# Patient Record
Sex: FEMALE | Race: WHITE | NOT HISPANIC OR LATINO | Employment: OTHER | URBAN - METROPOLITAN AREA
[De-identification: names, ages, dates, MRNs, and addresses within clinical notes are randomized per-mention and may not be internally consistent; named-entity substitution may affect disease eponyms.]

---

## 2017-05-02 ENCOUNTER — HOSPITAL ENCOUNTER (OUTPATIENT)
Dept: GENERAL RADIOLOGY | Facility: HOSPITAL | Age: 77
Discharge: HOME | End: 2017-05-02
Attending: INTERNAL MEDICINE | Admitting: INTERNAL MEDICINE

## 2017-05-02 ENCOUNTER — TRANSCRIBE ORDERS (OUTPATIENT)
Dept: ADMINISTRATIVE | Facility: HOSPITAL | Age: 77
End: 2017-05-02

## 2017-05-02 DIAGNOSIS — R06.02 SHORTNESS OF BREATH ON EXERTION: Primary | ICD-10-CM

## 2017-05-02 PROCEDURE — 71020 HC CHEST PA AND LATERAL: CPT

## 2018-06-18 ENCOUNTER — TRANSCRIBE ORDERS (OUTPATIENT)
Dept: ADMINISTRATIVE | Facility: HOSPITAL | Age: 78
End: 2018-06-18

## 2018-06-18 DIAGNOSIS — Z78.0 POSTMENOPAUSAL: Primary | ICD-10-CM

## 2018-06-18 DIAGNOSIS — Z12.31 VISIT FOR SCREENING MAMMOGRAM: Primary | ICD-10-CM

## 2019-01-04 PROBLEM — R26.9 GAIT DISTURBANCE: Status: ACTIVE | Noted: 2019-01-04

## 2019-01-04 PROBLEM — Z91.81 RISK FOR FALLS: Status: ACTIVE | Noted: 2019-01-04

## 2019-01-04 PROBLEM — R06.02 SHORTNESS OF BREATH ON EXERTION: Status: ACTIVE | Noted: 2019-01-04

## 2019-01-04 PROBLEM — F02.80 LATE ONSET ALZHEIMER'S DISEASE WITHOUT BEHAVIORAL DISTURBANCE (HCC): Status: ACTIVE | Noted: 2019-01-04

## 2019-01-04 PROBLEM — G30.1 LATE ONSET ALZHEIMER'S DISEASE WITHOUT BEHAVIORAL DISTURBANCE (HCC): Status: ACTIVE | Noted: 2019-01-04

## 2019-01-04 PROBLEM — E78.2 MIXED HYPERLIPIDEMIA: Status: ACTIVE | Noted: 2019-01-04

## 2019-01-04 PROBLEM — K21.9 GASTROESOPHAGEAL REFLUX DISEASE WITHOUT ESOPHAGITIS: Status: ACTIVE | Noted: 2019-01-04

## 2019-01-04 PROBLEM — F32.1 DEPRESSION, MAJOR, SINGLE EPISODE, MODERATE (HCC): Status: ACTIVE | Noted: 2019-01-04

## 2019-01-04 PROBLEM — E53.8 B12 DEFICIENCY: Status: ACTIVE | Noted: 2019-01-04

## 2019-01-09 RX ORDER — ESCITALOPRAM OXALATE 5 MG/1
TABLET ORAL
Qty: 90 TABLET | Refills: 2 | Status: SHIPPED | OUTPATIENT
Start: 2019-01-09 | End: 2019-01-25 | Stop reason: DRUGHIGH

## 2019-01-25 ENCOUNTER — LAB REQUISITION (OUTPATIENT)
Dept: LAB | Facility: HOSPITAL | Age: 79
End: 2019-01-25

## 2019-01-25 ENCOUNTER — OFFICE VISIT (OUTPATIENT)
Dept: INTERNAL MEDICINE | Facility: CLINIC | Age: 79
End: 2019-01-25

## 2019-01-25 VITALS
HEIGHT: 65 IN | DIASTOLIC BLOOD PRESSURE: 72 MMHG | BODY MASS INDEX: 26.99 KG/M2 | TEMPERATURE: 98 F | SYSTOLIC BLOOD PRESSURE: 122 MMHG | HEART RATE: 76 BPM | WEIGHT: 162 LBS

## 2019-01-25 DIAGNOSIS — F02.80 LATE ONSET ALZHEIMER'S DISEASE WITHOUT BEHAVIORAL DISTURBANCE (HCC): Primary | ICD-10-CM

## 2019-01-25 DIAGNOSIS — K21.9 GASTROESOPHAGEAL REFLUX DISEASE WITHOUT ESOPHAGITIS: ICD-10-CM

## 2019-01-25 DIAGNOSIS — Z00.00 ROUTINE GENERAL MEDICAL EXAMINATION AT A HEALTH CARE FACILITY: ICD-10-CM

## 2019-01-25 DIAGNOSIS — Z91.81 RISK FOR FALLS: ICD-10-CM

## 2019-01-25 DIAGNOSIS — E78.2 MIXED HYPERLIPIDEMIA: ICD-10-CM

## 2019-01-25 DIAGNOSIS — F32.1 DEPRESSION, MAJOR, SINGLE EPISODE, MODERATE (HCC): ICD-10-CM

## 2019-01-25 DIAGNOSIS — G30.1 LATE ONSET ALZHEIMER'S DISEASE WITHOUT BEHAVIORAL DISTURBANCE (HCC): Primary | ICD-10-CM

## 2019-01-25 DIAGNOSIS — R26.9 GAIT DISTURBANCE: ICD-10-CM

## 2019-01-25 DIAGNOSIS — R06.02 SHORTNESS OF BREATH ON EXERTION: ICD-10-CM

## 2019-01-25 DIAGNOSIS — E53.8 B12 DEFICIENCY: ICD-10-CM

## 2019-01-25 LAB
ALBUMIN SERPL-MCNC: 4.59 G/DL (ref 3.2–4.8)
ALBUMIN/GLOB SERPL: 1.6 G/DL (ref 1.5–2.5)
ALP SERPL-CCNC: 116 U/L (ref 25–100)
ALT SERPL W P-5'-P-CCNC: 19 U/L (ref 7–40)
ANION GAP SERPL CALCULATED.3IONS-SCNC: 13 MMOL/L (ref 3–11)
AST SERPL-CCNC: 28 U/L (ref 0–33)
BILIRUB SERPL-MCNC: 0.6 MG/DL (ref 0.3–1.2)
BUN BLD-MCNC: 18 MG/DL (ref 9–23)
BUN/CREAT SERPL: 20.5 (ref 7–25)
CALCIUM SPEC-SCNC: 9.4 MG/DL (ref 8.7–10.4)
CHLORIDE SERPL-SCNC: 104 MMOL/L (ref 99–109)
CHOLEST SERPL-MCNC: 164 MG/DL (ref 0–200)
CO2 SERPL-SCNC: 22 MMOL/L (ref 20–31)
CREAT BLD-MCNC: 0.88 MG/DL (ref 0.6–1.3)
FOLATE SERPL-MCNC: 8.32 NG/ML (ref 3.2–20)
GFR SERPL CREATININE-BSD FRML MDRD: 62 ML/MIN/1.73
GLOBULIN UR ELPH-MCNC: 2.9 GM/DL
GLUCOSE BLD-MCNC: 105 MG/DL (ref 70–100)
HDLC SERPL-MCNC: 53 MG/DL (ref 40–60)
LDLC SERPL CALC-MCNC: 77 MG/DL (ref 0–100)
LDLC/HDLC SERPL: 1.45 {RATIO}
POTASSIUM BLD-SCNC: 4.3 MMOL/L (ref 3.5–5.5)
PROT SERPL-MCNC: 7.5 G/DL (ref 5.7–8.2)
SODIUM BLD-SCNC: 139 MMOL/L (ref 132–146)
TRIGL SERPL-MCNC: 172 MG/DL (ref 0–150)
VIT B12 BLD-MCNC: 918 PG/ML (ref 211–911)
VLDLC SERPL-MCNC: 34.4 MG/DL

## 2019-01-25 PROCEDURE — 82746 ASSAY OF FOLIC ACID SERUM: CPT | Performed by: INTERNAL MEDICINE

## 2019-01-25 PROCEDURE — 80061 LIPID PANEL: CPT | Performed by: INTERNAL MEDICINE

## 2019-01-25 PROCEDURE — 80053 COMPREHEN METABOLIC PANEL: CPT | Performed by: INTERNAL MEDICINE

## 2019-01-25 PROCEDURE — 99214 OFFICE O/P EST MOD 30 MIN: CPT | Performed by: INTERNAL MEDICINE

## 2019-01-25 PROCEDURE — 82607 VITAMIN B-12: CPT | Performed by: INTERNAL MEDICINE

## 2019-01-25 RX ORDER — PRAVASTATIN SODIUM 20 MG
20 TABLET ORAL DAILY
COMMUNITY
End: 2019-09-03 | Stop reason: SDUPTHER

## 2019-01-25 RX ORDER — UBIDECARENONE 75 MG
50 CAPSULE ORAL DAILY
COMMUNITY
End: 2019-01-27

## 2019-01-25 RX ORDER — DONEPEZIL HYDROCHLORIDE 10 MG/1
10 TABLET, FILM COATED ORAL NIGHTLY
COMMUNITY
End: 2019-06-03 | Stop reason: SDUPTHER

## 2019-01-25 RX ORDER — ESCITALOPRAM OXALATE 10 MG/1
10 TABLET ORAL DAILY
Qty: 30 TABLET | Refills: 5 | Status: SHIPPED | OUTPATIENT
Start: 2019-01-25 | End: 2019-09-13

## 2019-01-25 RX ORDER — MEMANTINE HYDROCHLORIDE 14 MG/1
CAPSULE, EXTENDED RELEASE ORAL
COMMUNITY
Start: 2018-12-17 | End: 2019-09-05 | Stop reason: SDUPTHER

## 2019-01-25 NOTE — PATIENT INSTRUCTIONS
Continue current medications for memory.  Increase escitalopram from 5 mg a day to 10 mg a day to improve depression.  Doing some physical activity also helps depression.  Social interaction helps depression.    Try to do some of the exercises from physical therapy every day.  Try to walk around the house more.  Try to eat less fats and sugars and snack foods.  Fall Prevention in the Home  Falls can cause injuries. They can happen to people of all ages. There are many things you can do to make your home safe and to help prevent falls.  What can I do on the outside of my home?  · Regularly fix the edges of walkways and driveways and fix any cracks.  · Remove anything that might make you trip as you walk through a door, such as a raised step or threshold.  · Trim any bushes or trees on the path to your home.  · Use bright outdoor lighting.  · Clear any walking paths of anything that might make someone trip, such as rocks or tools.  · Regularly check to see if handrails are loose or broken. Make sure that both sides of any steps have handrails.  · Any raised decks and porches should have guardrails on the edges.  · Have any leaves, snow, or ice cleared regularly.  · Use sand or salt on walking paths during winter.  · Clean up any spills in your garage right away. This includes oil or grease spills.  What can I do in the bathroom?  · Use night lights.  · Install grab bars by the toilet and in the tub and shower. Do not use towel bars as grab bars.  · Use non-skid mats or decals in the tub or shower.  · If you need to sit down in the shower, use a plastic, non-slip stool.  · Keep the floor dry. Clean up any water that spills on the floor as soon as it happens.  · Remove soap buildup in the tub or shower regularly.  · Attach bath mats securely with double-sided non-slip rug tape.  · Do not have throw rugs and other things on the floor that can make you trip.  What can I do in the bedroom?  · Use night lights.  · Make sure  that you have a light by your bed that is easy to reach.  · Do not use any sheets or blankets that are too big for your bed. They should not hang down onto the floor.  · Have a firm chair that has side arms. You can use this for support while you get dressed.  · Do not have throw rugs and other things on the floor that can make you trip.  What can I do in the kitchen?  · Clean up any spills right away.  · Avoid walking on wet floors.  · Keep items that you use a lot in easy-to-reach places.  · If you need to reach something above you, use a strong step stool that has a grab bar.  · Keep electrical cords out of the way.  · Do not use floor polish or wax that makes floors slippery. If you must use wax, use non-skid floor wax.  · Do not have throw rugs and other things on the floor that can make you trip.  What can I do with my stairs?  · Do not leave any items on the stairs.  · Make sure that there are handrails on both sides of the stairs and use them. Fix handrails that are broken or loose. Make sure that handrails are as long as the stairways.  · Check any carpeting to make sure that it is firmly attached to the stairs. Fix any carpet that is loose or worn.  · Avoid having throw rugs at the top or bottom of the stairs. If you do have throw rugs, attach them to the floor with carpet tape.  · Make sure that you have a light switch at the top of the stairs and the bottom of the stairs. If you do not have them, ask someone to add them for you.  What else can I do to help prevent falls?  · Wear shoes that:  ? Do not have high heels.  ? Have rubber bottoms.  ? Are comfortable and fit you well.  ? Are closed at the toe. Do not wear sandals.  · If you use a stepladder:  ? Make sure that it is fully opened. Do not climb a closed stepladder.  ? Make sure that both sides of the stepladder are locked into place.  ? Ask someone to hold it for you, if possible.  · Clearly pako and make sure that you can see:  ? Any grab bars or  handrails.  ? First and last steps.  ? Where the edge of each step is.  · Use tools that help you move around (mobility aids) if they are needed. These include:  ? Canes.  ? Walkers.  ? Scooters.  ? Crutches.  · Turn on the lights when you go into a dark area. Replace any light bulbs as soon as they burn out.  · Set up your furniture so you have a clear path. Avoid moving your furniture around.  · If any of your floors are uneven, fix them.  · If there are any pets around you, be aware of where they are.  · Review your medicines with your doctor. Some medicines can make you feel dizzy. This can increase your chance of falling.  Ask your doctor what other things that you can do to help prevent falls.  This information is not intended to replace advice given to you by your health care provider. Make sure you discuss any questions you have with your health care provider.  Document Released: 10/14/2010 Document Revised: 05/25/2017 Document Reviewed: 01/22/2016  [employer] Interactive Patient Education © 2018 [employer]    Supporting Someone With Depression  Depression is a mental health condition that affects the way a person feels, thinks, and handles daily activities such as eating, sleeping, and working. When a person has depression, his or her condition can affect others around him or her, such as friends and family members. Friends and family can help by offering support and understanding.  What do I need to know about this condition?  The main symptoms of depression are:  · Constant depressed or irritable mood.  · Loss of interest in things and activities that were enjoyed in the past.    Other symptoms of depression include:  · Fatigue.  · Sleeping too much or too little.  · Difficulty falling asleep, or waking up early and not being able to get back to sleep.  · Difficulty concentrating or making decisions.  · Changes in appetite and weight.  · Staying away from others (isolating oneself).  · Expressing  feelings of guilt.  · Expressing suicidal thoughts or feelings.    What do I need to know about the treatment options?  This condition is usually treated by mental health providers such as psychologists, psychiatrists, and clinical social workers. Treatment may include one or more of the following:  · Psychotherapy, also called talk therapy or counseling. Types of psychotherapy include individual or group therapy, and they usually involve the following approaches:  ? Cognitive behavioral therapy (CBT). This type of therapy teaches a person how to recognize feelings, thoughts, and behaviors that contribute to depression. The person is taught to make a choice about how to respond to these feelings, thoughts, and behaviors so that he or she can experience fewer symptoms.  ? Interpersonal therapy (IPT). This helps to improve the way that someone with depression relates to and communicates with others. This type of therapy may involve caregivers and loved ones.  ? Family therapy. This treatment helps family members to communicate and deal with conflict in healthy ways.  · Medicine. This is often used to help with certain emotions and behaviors.    Combining medicine and therapy is often the most effective approach. The following lifestyle changes may also help with managing symptoms of depression:  · Limiting alcohol and drug use.  · Exercising regularly.  · Getting enough good-quality sleep.  · Making healthy eating choices.  · Reducing distressing situations.  · Spending time outside.  · Following regular daily routines.    How can I support my loved one?  Talk about the condition  Good communication is the key to supporting your friend or family member. Here are a few things to keep in mind:  · Ask your loved one how you can support him or her.  · Respect your loved one's right to make decisions.  · Be careful about too much prodding. Try not to overdo reminders to an adult friend or family member about things like  taking medicines. Ask how your loved one prefers that you help.  · Be encouraging and offer emotional support. This can help to lower stress. Even saying something simple to comfort your loved one may help.  · Never ignore comments about suicide, and do not try to avoid the subject of suicide. Talking about suicide will not make your loved one want to act on it. You or your loved one can reach out 24 hours a day to get free, private support (on the phone or a live online chat) from a suicide crisis helpline, such as the National Suicide Prevention Lifeline at 1-452.417.1032.  · Listening is very important. Be available if your friend or family member wants to talk. Make an effort to acknowledge his or her feelings and stay calm and realistic.    Find support and resources  A health care provider may be able to recommend mental health resources that are available online or over the phone. You could start with:  · Government sites such as the Substance Abuse and Mental Health Services Administration (SAMHSA): www.samhsa.gov  · National mental health organizations such as the National [place] on [place]ness (HIRA): www.hira.org    You may also consider:  · Joining self-help and support groups, not only for your friend or family member, but also for yourself. People in these peer and family support groups understand what you and your loved one are going through. They can help you feel a sense of hope and connect you with local resources to help you learn more.  · Attending family therapy with your loved one.    General support  · Make an effort to learn all you can about depression.  · Help your loved one follow his or her treatment plan as directed by health care providers. This could mean driving him or her to therapy sessions or suggesting ways to cope with stress.  · Ask your loved one if you may join him or her for a therapy session or go with him or her to health care visits. Joining your loved one with his or  "her permission can give you an opportunity to learn how to be more supportive.  · Include your loved one in activities. Invite her or him to go for walks and outings. At first, your loved one may not want to, but keep trying.  · Be patient and do not expect your loved one to do too much too soon.  · Help with daily responsibilities, such as laundry or meals. Sometimes daily tasks seem overwhelming to a person with depression.  · Remember that your support really matters. Social support is a huge benefit for someone who is coping with depression.  How can I create a safe environment?  If your loved one feels unable to control his or her behavior, it may be necessary to take steps to keep his or her home safe. Such steps may include:  · Locking up alcohol and prescription pills that your loved one may turn to. Count prescription pills often. You may want to consider removing alcohol from the home.  · Removing or locking up guns and other weapons. If you do not have a safe place to keep a gun, local law enforcement may store a gun for you.  · Making a written crisis plan. Include important phone numbers, such as the local crisis intervention team. Make sure that:  ? The person with depression knows about this plan.  ? Everyone who has regular contact with that person knows about the plan and knows what to do in an emergency.    How should I care for myself?  It is important to find ways to care for your body, mind, and well-being while supporting someone with depression.  · Spend time with friends and family. Find someone you can talk to who will also help you work on using coping skills to manage stress. Consider seeking therapy for yourself.  · Try to maintain your normal routines. This can help you remember that your life is about more than your loved one's condition.  · Understand what your limits are. Say \"no\" to requests or events that lead to a schedule that is too busy.  · Make time for activities that help you " relax, and try to not feel guilty about taking time for yourself.  · Consider trying meditation and deep breathing exercises to lower stress.  · Get plenty of sleep.  · Exercise, even if it is just taking a short walk a few times a week.    What are some signs that the condition is getting worse?  Signs that your loved one's condition may be getting worse include:  · Symptoms returning or getting worse.  · Not taking medicines or attending therapy as prescribed.  · Having more trouble sleeping or doing everyday activities.  · Withdrawal from friends and family.    Get help right away if:  · Your loved one expresses serious thoughts about self-harm or about hurting others.  · Your loved one sees, hears, tastes, smells, or feels things that are not present (hallucinations).  If you ever feel like your loved one may hurt himself or herself or others, or may have thoughts about taking his or her own life, get help right away. You can go to your nearest emergency department or call:  · Your local emergency services (911 in the [place]  · A suicide crisis helpline, such as the National Suicide Prevention Lifeline at 1-574.131.4814. This is open 24 hours a day.    Summary  · Depression is a mood disorder that affects the way a person feels, thinks, and handles daily activities.  · Depression is usually treated by mental health professionals. It may include psychotherapy, medicine, lifestyle changes, or a combination of these approaches.  · When you support a loved one with depression, it is important to keep yourself healthy and safe.  · Get help right away if your loved one expresses serious thoughts about self-harm.  This information is not intended to replace advice given to you by your health care provider. Make sure you discuss any questions you have with your health care provider.  Document Released: 05/01/2018 Document Revised: 05/01/2018 Document Reviewed: 05/01/2018  [employer] Interactive Patient Education © 2018  [employer]

## 2019-01-25 NOTE — PROGRESS NOTES
[facility]     Jia Bright  1940   [id number]      Patient Care Team:  Estephania Wolfe MD as PCP - General (Internal Medicine)    Chief Complaint;:   Chief Complaint   Patient presents with   • Follow-up     fasting, hyperlipidemia, gait disturbance            HPI  Patient is a 78 y.o. female presents with f/u dementia, hyperlipidemia and gait.    CHRONIC CONDITIONS  Lives with her daughter who tries to feed her healthy low fat diet.  She is at work [employer]. She leaves things for pt's lunch that are easy for her to get out.Good appetite.    She may be a bit less depressed, but daughter says she still stays in her room a lot.  She finished PT for strength and balance. Daughter says it did help some. No falls since last here.  She has not been doing any of the exercises since PT ended.She does not move around the house much. Sedentary.  No behaviour problems. No wondering at nite. She sleeps all nite.   Memory is about the same.     GERD symptoms controlled. No side effects with meds. Shortness of breath may be better.     Past Medical History:   Diagnosis Date   • Chronic bilateral low back pain without sciatica    • Dementia 2011    MRI brain/carotic/EEG done   • Hyperlipidemia    • Tobacco use disorder        Past Surgical History:   Procedure Laterality Date   • CHOLECYSTECTOMY  1974       Family History   Problem Relation Age of Onset   • Alzheimer's disease Mother    • Colon cancer Mother    • Skin cancer Brother    • Other Brother         amputation leg secondary to blood clot-details unknown   • Hypertension Daughter    • Diabetes Maternal Uncle    • Colon cancer Maternal Grandfather        Social History     Socioeconomic History   • Marital status:      Spouse name: Not on file   • Number of children: Not on file   • Years of education: Not on file   • Highest education level: Not on file   Social Needs   • Financial resource strain: Not on file   • Food insecurity - worry:  "Not on file   • Food insecurity - inability: Not on file   • Transportation needs - medical: Not on file   • Transportation needs - non-medical: Not on file   Occupational History   • Not on file   Tobacco Use   • Smoking status: Former Smoker     Types: Cigarettes   Substance and Sexual Activity   • Alcohol use: No     Frequency: Never   • Drug use: Defer   • Sexual activity: Defer   Other Topics Concern   • Not on file   Social History Narrative   • Not on file       No Known Allergies    Review of Systems:     Review of Systems   Constitutional: Negative for chills, fatigue and fever.   HENT: Negative for congestion, ear pain and sinus pressure.    Respiratory: Positive for shortness of breath. Negative for cough, chest tightness and wheezing.    Cardiovascular: Negative for chest pain and palpitations.   Gastrointestinal: Positive for diarrhea. Negative for abdominal pain, blood in stool and constipation.        Loose stools after eating whenever they eat out. No abdominal pain or nausea. Going on a long time.   Skin: Negative for color change.   Allergic/Immunologic: Negative for environmental allergies.   Neurological: Negative for dizziness, speech difficulty and headache.   Psychiatric/Behavioral: Negative for decreased concentration. The patient is not nervous/anxious.        Vital Signs  Vitals:    01/25/19 1028   BP: 122/72   BP Location: Left arm   Patient Position: Sitting   Cuff Size: Adult   Pulse: 76   Temp: 98 °F (36.7 °C)   TempSrc: Temporal   Weight: 73.5 kg (162 lb)   Height: 165.1 cm (65\")   PainSc: 0-No pain       Body mass index is 26.96 kg/m².      Current Outpatient Medications:   •  donepezil (ARICEPT) 10 MG tablet, Take 10 mg by mouth Every Night., Disp: , Rfl:   •  memantine (NAMENDA XR) 14 MG capsule sustained-release 24 hr extended release capsule, Take one tablet by mouth once a day, Disp: , Rfl:   •  pravastatin (PRAVACHOL) 20 MG tablet, Take 20 mg by mouth Daily., Disp: , Rfl:   •  " vitamin B-12 (CYANOCOBALAMIN) 100 MCG tablet, Take 50 mcg by mouth Daily. Take one tablet daily (unsure of strength), Disp: , Rfl:   •  Calcium Carb-Cholecalciferol (OS-OSCAR CALCIUM + D3) 500-200 MG-UNIT tablet, Take 1 tablet by mouth Daily., Disp: 60 tablet, Rfl: 11  •  escitalopram (LEXAPRO) 10 MG tablet, Take 1 tablet by mouth Daily., Disp: 30 tablet, Rfl: 5    Physical Exam:    Physical Exam   Constitutional: She appears well-developed and well-nourished.   HENT:   Head: Normocephalic.   Eyes: Conjunctivae and EOM are normal. Pupils are equal, round, and reactive to light.   Neck: Normal range of motion. Neck supple. No thyromegaly present.   Cardiovascular: Normal rate, regular rhythm, normal heart sounds and intact distal pulses.   Pulmonary/Chest: Effort normal and breath sounds normal.   Musculoskeletal: Normal range of motion. She exhibits no edema.   Lymphadenopathy:     She has no cervical adenopathy.   Neurological: She is alert. She has normal strength. No cranial nerve deficit or sensory deficit. Gait abnormal.   Oriented to person and place,   Psychiatric: She has a normal mood and affect. Her speech is normal and behavior is normal. She is not actively hallucinating. Thought content is not paranoid. Cognition and memory are impaired. She expresses impulsivity. She expresses no suicidal ideation. She exhibits abnormal recent memory and abnormal remote memory. She is attentive.   Nursing note and vitals reviewed.       Results Review:    None    CMP:  Lab Results   Component Value Date    BUN 18 01/25/2019    CREATININE 0.88 01/25/2019    EGFRIFNONA 62 01/25/2019    BCR 20.5 01/25/2019     01/25/2019    K 4.3 01/25/2019    CO2 22.0 01/25/2019    CALCIUM 9.4 01/25/2019    ALBUMIN 4.59 01/25/2019    BILITOT 0.6 01/25/2019    ALKPHOS 116 (H) 01/25/2019    AST 28 01/25/2019    ALT 19 01/25/2019     HbA1c:  No results found for: HGBA1C  Microalbumin:  No results found for: MICROALBUR, POCMALB,  POCCREAT  Lipid Panel  Lab Results   Component Value Date    CHOL 164 01/25/2019    TRIG 172 (H) 01/25/2019    HDL 53 01/25/2019    LDL 77 01/25/2019    AST 28 01/25/2019    ALT 19 01/25/2019       Medication Review: Medications reviewed and noted    Assessment/Plan:    Jia was seen today for follow-up.    Diagnoses and all orders for this visit:    Late onset Alzheimer's disease without behavioral disturbance    Mixed hyperlipidemia  -     Comprehensive metabolic panel; Future  -     Lipid Panel With LDL/HDL Ratio; Future    Depression, major, single episode, moderate (CMS/HCC)  -     escitalopram (LEXAPRO) 10 MG tablet; Take 1 tablet by mouth Daily.    Gastroesophageal reflux disease without esophagitis    Gait disturbance    Risk for falls    B12 deficiency  -     Vitamin B12; Future  -     Folate; Future    Shortness of breath on exertion    Other orders  -     Calcium Carb-Cholecalciferol (OS-OSCAR CALCIUM + D3) 500-200 MG-UNIT tablet; Take 1 tablet by mouth Daily.        Patient Instructions   Continue current medications for memory.  Increase escitalopram from 5 mg a day to 10 mg a day to improve depression.  Doing some physical activity also helps depression.  Social interaction helps depression.    Try to do some of the exercises from physical therapy every day.  Try to walk around the house more.  Try to eat less fats and sugars and snack foods.      Plan of care reviewed with patient at the conclusion of today's visit. Education was provided regarding diagnosis, management, and any prescribed or recommended OTC medications.Patient verbalizes understanding of and agreement with management plan.         Estephania Wolfe MD

## 2019-04-16 PROBLEM — N95.9 MENOPAUSAL AND PERIMENOPAUSAL DISORDER: Status: ACTIVE | Noted: 2019-04-16

## 2019-04-16 PROBLEM — Z12.31 VISIT FOR SCREENING MAMMOGRAM: Status: ACTIVE | Noted: 2019-04-16

## 2019-05-03 ENCOUNTER — OFFICE VISIT (OUTPATIENT)
Dept: INTERNAL MEDICINE | Facility: CLINIC | Age: 79
End: 2019-05-03

## 2019-05-03 VITALS
BODY MASS INDEX: 26.49 KG/M2 | SYSTOLIC BLOOD PRESSURE: 120 MMHG | DIASTOLIC BLOOD PRESSURE: 86 MMHG | HEART RATE: 100 BPM | OXYGEN SATURATION: 98 % | HEIGHT: 65 IN | WEIGHT: 159 LBS

## 2019-05-03 DIAGNOSIS — F02.80 LATE ONSET ALZHEIMER'S DISEASE WITHOUT BEHAVIORAL DISTURBANCE (HCC): ICD-10-CM

## 2019-05-03 DIAGNOSIS — R06.02 SHORTNESS OF BREATH ON EXERTION: ICD-10-CM

## 2019-05-03 DIAGNOSIS — R26.9 GAIT DISTURBANCE: ICD-10-CM

## 2019-05-03 DIAGNOSIS — G30.1 LATE ONSET ALZHEIMER'S DISEASE WITHOUT BEHAVIORAL DISTURBANCE (HCC): ICD-10-CM

## 2019-05-03 DIAGNOSIS — F32.1 DEPRESSION, MAJOR, SINGLE EPISODE, MODERATE (HCC): Primary | ICD-10-CM

## 2019-05-03 DIAGNOSIS — E78.2 MIXED HYPERLIPIDEMIA: ICD-10-CM

## 2019-05-03 PROCEDURE — 99214 OFFICE O/P EST MOD 30 MIN: CPT | Performed by: INTERNAL MEDICINE

## 2019-05-03 RX ORDER — NYSTATIN 100000 U/G
CREAM TOPICAL
COMMUNITY
Start: 2018-05-09

## 2019-05-03 RX ORDER — LANOLIN ALCOHOL/MO/W.PET/CERES
CREAM (GRAM) TOPICAL
COMMUNITY
Start: 2018-03-13 | End: 2019-06-03 | Stop reason: SDUPTHER

## 2019-05-03 NOTE — PROGRESS NOTES
[facility]     Jia Bright  1940   [id number]      Patient Care Team:  Estephania Wolfe MD as PCP - General (Internal Medicine)    Chief Complaint;:   Chief Complaint   Patient presents with   • Hyperlipidemia     follow-up            HPI  Patient is a 78 y.o. female presents with follow-up of hyperlipidemia, dementia, depression    CHRONIC CONDITIONS  Hyperlipidemia had improved someone we did the labs in January.  She takes the pravastatin every day.    For dementia she is taking Aricept and Namenda.  No side effects with them.  She and her daughter both feel things are pretty stable.  She does kind of staying in her room a lot but she does not really seem as depressed.  She does come out and interact with the family at mealtime and in the evening when relaxing.  There are no behavioral problems.  No wandering during the middle of the night.  She sleeps well and her appetite is good.    No falls.    Past Medical History:   Diagnosis Date   • Chronic bilateral low back pain without sciatica 2017   • Dementia     MRI brain/carotic/EEG done   • Hyperlipidemia    • Tobacco use disorder 2011       Past Surgical History:   Procedure Laterality Date   • CHOLECYSTECTOMY         Family History   Problem Relation Age of Onset   • Alzheimer's disease Mother    • Colon cancer Mother    • Skin cancer Brother    • Other Brother         amputation leg secondary to blood clot-details unknown   • Hypertension Daughter    • Diabetes Maternal Uncle    • Colon cancer Maternal Grandfather        Social History     Socioeconomic History   • Marital status:      Spouse name: Not on file   • Number of children: Not on file   • Years of education: Not on file   • Highest education level: Not on file   Tobacco Use   • Smoking status: Former Smoker     Types: Cigarettes     Last attempt to quit: 2017     Years since quittin.3   • Tobacco comment: 4 cig a day    Substance and Sexual  "Activity   • Alcohol use: No     Frequency: Never   • Drug use: Defer   • Sexual activity: Defer       No Known Allergies    Review of Systems:     Review of Systems   Constitutional: Negative for appetite change, chills, diaphoresis, fatigue, fever and unexpected weight gain.   HENT: Negative for congestion, ear pain, hearing loss, nosebleeds, rhinorrhea, sore throat, trouble swallowing and voice change.    Eyes: Negative for pain, redness, itching and visual disturbance.   Respiratory: Negative for cough, shortness of breath and wheezing.    Cardiovascular: Negative for chest pain, palpitations and leg swelling.   Gastrointestinal: Negative for abdominal pain, blood in stool, constipation, diarrhea, nausea, vomiting and GERD.   Endocrine: Negative for cold intolerance and heat intolerance.   Genitourinary: Negative for urinary incontinence, dysuria, frequency, hematuria and urgency.   Musculoskeletal: Positive for gait problem. Negative for arthralgias, joint swelling and myalgias.   Skin: Negative for color change and rash.   Neurological: Positive for memory problem. Negative for dizziness, seizures, syncope, weakness, light-headedness and numbness.   Hematological: Negative for adenopathy. Does not bruise/bleed easily.   Psychiatric/Behavioral: Positive for dysphoric mood. Negative for behavioral problems, sleep disturbance, suicidal ideas and depressed mood. The patient is not nervous/anxious.        Vital Signs  Vitals:    05/03/19 1418   BP: 120/86   BP Location: Left arm   Patient Position: Sitting   Cuff Size: Adult   Pulse: 100   SpO2: 98%   Weight: 72.1 kg (159 lb)   Height: 165.1 cm (65\")     Body mass index is 26.46 kg/m².    Current Outpatient Medications:   •  Calcium Carb-Cholecalciferol (OS-OSCAR CALCIUM + D3) 500-200 MG-UNIT tablet, Take 1 tablet by mouth Daily., Disp: 60 tablet, Rfl: 11  •  donepezil (ARICEPT) 10 MG tablet, Take 10 mg by mouth Every Night., Disp: , Rfl:   •  escitalopram (LEXAPRO) " 10 MG tablet, Take 1 tablet by mouth Daily., Disp: 30 tablet, Rfl: 5  •  memantine (NAMENDA XR) 14 MG capsule sustained-release 24 hr extended release capsule, Take one tablet by mouth once a day, Disp: , Rfl:   •  MULTIPLE VITAMIN PO, Take 1 tablet by mouth Daily., Disp: , Rfl:   •  nystatin (MYCOSTATIN) 983821 UNIT/GM cream, apply by topical route 2 times every day to the affected area(s), Disp: , Rfl:   •  pravastatin (PRAVACHOL) 20 MG tablet, Take 20 mg by mouth Daily., Disp: , Rfl:   •  vitamin B-12 (CYANOCOBALAMIN) 1000 MCG tablet, take 1 Tablet by Oral route once a day, Disp: , Rfl:     Physical Exam:    Physical Exam   Constitutional: She is oriented to person, place, and time. She appears well-developed and well-nourished.   HENT:   Head: Normocephalic.   Eyes: Conjunctivae and EOM are normal. Pupils are equal, round, and reactive to light.   Neck: Normal range of motion. Neck supple. No thyromegaly present.   Cardiovascular: Normal rate, regular rhythm, normal heart sounds and intact distal pulses.   Pulmonary/Chest: Effort normal and breath sounds normal.   Musculoskeletal: Normal range of motion. She exhibits no edema.   Lymphadenopathy:     She has no cervical adenopathy.   Neurological: She is alert and oriented to person, place, and time. Gait abnormal.   Psychiatric: She has a normal mood and affect. Her speech is delayed. She is slowed. She exhibits abnormal recent memory and abnormal remote memory.   Nursing note and vitals reviewed.       ACE III MINI        Results Review:    None    CMP:  Lab Results   Component Value Date    BUN 18 01/25/2019    CREATININE 0.88 01/25/2019    EGFRIFNONA 62 01/25/2019    BCR 20.5 01/25/2019     01/25/2019    K 4.3 01/25/2019    CO2 22.0 01/25/2019    CALCIUM 9.4 01/25/2019    ALBUMIN 4.59 01/25/2019    BILITOT 0.6 01/25/2019    ALKPHOS 116 (H) 01/25/2019    AST 28 01/25/2019    ALT 19 01/25/2019     HbA1c:  No results found for: HGBA1C  Microalbumin:  No  results found for: MICROALBUR, POCMALB, POCCREAT  Lipid Panel  Lab Results   Component Value Date    CHOL 164 01/25/2019    TRIG 172 (H) 01/25/2019    HDL 53 01/25/2019    LDL 77 01/25/2019    AST 28 01/25/2019    ALT 19 01/25/2019       Medication Review: Medications reviewed and noted    Assessment/Plan:    Jia was seen today for hyperlipidemia.    Diagnoses and all orders for this visit:    Depression, major, single episode, moderate (CMS/HCC)    Late onset Alzheimer's disease without behavioral disturbance    Gait disturbance    Shortness of breath on exertion    Mixed hyperlipidemia        Patient Instructions   For depression, continue taking escitalopram once a day.  Try to get out of your room and interact with the family more.  Walking around the house and being physically active also helps.    For memory, continue taking Aricept every evening and Namenda every evening.  Talking to other people, reading a little, being physically active around the house all help some with our memory.    For prevention of falls, doing some exercises from physical therapy every day does help prevent falls.  Just being physically active helps.    For hyperlipidemia, continue taking pravastatin every day.      Fall Prevention in the Home, Adult  Falls can cause injuries and can affect people from all age groups. There are many simple things that you can do to make your home safe and to help prevent falls. Ask for help when making these changes, if needed.  What actions can I take to prevent falls?  General instructions  · Use good lighting in all rooms. Replace any light bulbs that burn out.  · Turn on lights if it is dark. Use night-lights.  · Place frequently used items in easy-to-reach places. Lower the shelves around your home if necessary.  · Set up furniture so that there are clear paths around it. Avoid moving your furniture around.  · Remove throw rugs and other tripping hazards from the floor.  · Avoid walking on wet  floors.  · Fix any uneven floor surfaces.  · Add color or contrast paint or tape to grab bars and handrails in your home. Place contrasting color strips on the first and last steps of stairways.  · When you use a stepladder, make sure that it is completely opened and that the sides are firmly locked. Have someone hold the ladder while you are using it. Do not climb a closed stepladder.  · Be aware of any and all pets.  What can I do in the bathroom?  · Keep the floor dry. Immediately clean up any water that spills onto the floor.  · Remove soap buildup in the tub or shower on a regular basis.  · Use non-skid mats or decals on the floor of the tub or shower.  · Attach bath mats securely with double-sided, non-slip rug tape.  · If you need to sit down while you are in the shower, use a plastic, non-slip stool.  · Install grab bars by the toilet and in the tub and shower. Do not use towel bars as grab bars.  What can I do in the bedroom?  · Make sure that a bedside light is easy to reach.  · Do not use oversized bedding that drapes onto the floor.  · Have a firm chair that has side arms to use for getting dressed.  What can I do in the kitchen?  · Clean up any spills right away.  · If you need to reach for something above you, use a sturdy step stool that has a grab bar.  · Keep electrical cables out of the way.  · Do not use floor polish or wax that makes floors slippery. If you must use wax, make sure that it is non-skid floor wax.  What can I do in the stairways?  · Do not leave any items on the stairs.  · Make sure that you have a light switch at the top of the stairs and the bottom of the stairs. Have them installed if you do not have them.  · Make sure that there are handrails on both sides of the stairs. Fix handrails that are broken or loose. Make sure that handrails are as long as the stairways.  · Install non-slip stair treads on all stairs in your home.  · Avoid having throw rugs at the top or bottom of  stairways, or secure the rugs with carpet tape to prevent them from moving.  · Choose a carpet design that does not hide the edge of steps on the stairway.  · Check any carpeting to make sure that it is firmly attached to the stairs. Fix any carpet that is loose or worn.  What can I do on the outside of my home?  · Use bright outdoor lighting.  · Regularly repair the edges of walkways and driveways and fix any cracks.  · Remove high doorway thresholds.  · Trim any shrubbery on the main path into your home.  · Regularly check that handrails are securely fastened and in good repair. Both sides of any steps should have handrails.  · Install guardrails along the edges of any raised decks or porches.  · Clear walkways of debris and clutter, including tools and rocks.  · Have leaves, snow, and ice cleared regularly.  · Use sand or salt on walkways during winter months.  · In the garage, clean up any spills right away, including grease or oil spills.  What other actions can I take?  · Wear closed-toe shoes that fit well and support your feet. Wear shoes that have rubber soles or low heels.  · Use mobility aids as needed, such as canes, walkers, scooters, and crutches.  · Review your medicines with your health care provider. Some medicines can cause dizziness or changes in blood pressure, which increase your risk of falling.  Talk with your health care provider about other ways that you can decrease your risk of falls. This may include working with a physical therapist or  to improve your strength, balance, and endurance.  Where to find more information  · Centers for Disease Control and Prevention, [place]: https://www.cdc.gov  · National [place] on Aging: https://ja0nife.romy.nih.gov  Contact a health care provider if:  · You are afraid of falling at home.  · You feel weak, drowsy, or dizzy at home.  · You fall at home.  Summary  · There are many simple things that you can do to make your home safe and to help  prevent falls.  · Ways to make your home safe include removing tripping hazards and installing grab bars in the bathroom.  · Ask for help when making these changes in your home.  This information is not intended to replace advice given to you by your health care provider. Make sure you discuss any questions you have with your health care provider.  Document Released: 12/08/2003 Document Revised: 08/02/2018 Document Reviewed: 08/02/2018  [employer] Interactive Patient Education © 2019 [employer]        Plan of care reviewed with patient at the conclusion of today's visit. Education was provided regarding diagnosis, management, and any prescribed or recommended OTC medications.Patient verbalizes understanding of and agreement with management plan.         Estephania Wolfe MD

## 2019-05-03 NOTE — PATIENT INSTRUCTIONS
For depression, continue taking escitalopram once a day.  Try to get out of your room and interact with the family more.  Walking around the house and being physically active also helps.    For memory, continue taking Aricept every evening and Namenda every evening.  Talking to other people, reading a little, being physically active around the house all help some with our memory.    For prevention of falls, doing some exercises from physical therapy every day does help prevent falls.  Just being physically active helps.    For hyperlipidemia, continue taking pravastatin every day.      Fall Prevention in the Home, Adult  Falls can cause injuries and can affect people from all age groups. There are many simple things that you can do to make your home safe and to help prevent falls. Ask for help when making these changes, if needed.  What actions can I take to prevent falls?  General instructions  · Use good lighting in all rooms. Replace any light bulbs that burn out.  · Turn on lights if it is dark. Use night-lights.  · Place frequently used items in easy-to-reach places. Lower the shelves around your home if necessary.  · Set up furniture so that there are clear paths around it. Avoid moving your furniture around.  · Remove throw rugs and other tripping hazards from the floor.  · Avoid walking on wet floors.  · Fix any uneven floor surfaces.  · Add color or contrast paint or tape to grab bars and handrails in your home. Place contrasting color strips on the first and last steps of stairways.  · When you use a stepladder, make sure that it is completely opened and that the sides are firmly locked. Have someone hold the ladder while you are using it. Do not climb a closed stepladder.  · Be aware of any and all pets.  What can I do in the bathroom?  · Keep the floor dry. Immediately clean up any water that spills onto the floor.  · Remove soap buildup in the tub or shower on a regular basis.  · Use non-skid mats or  decals on the floor of the tub or shower.  · Attach bath mats securely with double-sided, non-slip rug tape.  · If you need to sit down while you are in the shower, use a plastic, non-slip stool.  · Install grab bars by the toilet and in the tub and shower. Do not use towel bars as grab bars.  What can I do in the bedroom?  · Make sure that a bedside light is easy to reach.  · Do not use oversized bedding that drapes onto the floor.  · Have a firm chair that has side arms to use for getting dressed.  What can I do in the kitchen?  · Clean up any spills right away.  · If you need to reach for something above you, use a sturdy step stool that has a grab bar.  · Keep electrical cables out of the way.  · Do not use floor polish or wax that makes floors slippery. If you must use wax, make sure that it is non-skid floor wax.  What can I do in the stairways?  · Do not leave any items on the stairs.  · Make sure that you have a light switch at the top of the stairs and the bottom of the stairs. Have them installed if you do not have them.  · Make sure that there are handrails on both sides of the stairs. Fix handrails that are broken or loose. Make sure that handrails are as long as the stairways.  · Install non-slip stair treads on all stairs in your home.  · Avoid having throw rugs at the top or bottom of stairways, or secure the rugs with carpet tape to prevent them from moving.  · Choose a carpet design that does not hide the edge of steps on the stairway.  · Check any carpeting to make sure that it is firmly attached to the stairs. Fix any carpet that is loose or worn.  What can I do on the outside of my home?  · Use bright outdoor lighting.  · Regularly repair the edges of walkways and driveways and fix any cracks.  · Remove high doorway thresholds.  · Trim any shrubbery on the main path into your home.  · Regularly check that handrails are securely fastened and in good repair. Both sides of any steps should have  handrails.  · Install guardrails along the edges of any raised decks or porches.  · Clear walkways of debris and clutter, including tools and rocks.  · Have leaves, snow, and ice cleared regularly.  · Use sand or salt on walkways during winter months.  · In the garage, clean up any spills right away, including grease or oil spills.  What other actions can I take?  · Wear closed-toe shoes that fit well and support your feet. Wear shoes that have rubber soles or low heels.  · Use mobility aids as needed, such as canes, walkers, scooters, and crutches.  · Review your medicines with your health care provider. Some medicines can cause dizziness or changes in blood pressure, which increase your risk of falling.  Talk with your health care provider about other ways that you can decrease your risk of falls. This may include working with a physical therapist or  to improve your strength, balance, and endurance.  Where to find more information  · Centers for Disease Control and Prevention, [place]: https://www.cdc.gov  · National [place] on Aging: https://dn6eanx.romy.nih.gov  Contact a health care provider if:  · You are afraid of falling at home.  · You feel weak, drowsy, or dizzy at home.  · You fall at home.  Summary  · There are many simple things that you can do to make your home safe and to help prevent falls.  · Ways to make your home safe include removing tripping hazards and installing grab bars in the bathroom.  · Ask for help when making these changes in your home.  This information is not intended to replace advice given to you by your health care provider. Make sure you discuss any questions you have with your health care provider.  Document Released: 12/08/2003 Document Revised: 08/02/2018 Document Reviewed: 08/02/2018  [employer] Interactive Patient Education © 2019 [employer]

## 2019-06-04 RX ORDER — CYANOCOBALAMIN (VITAMIN B-12) 1000 MCG
TABLET ORAL
Qty: 90 TABLET | Refills: 1 | Status: SHIPPED | OUTPATIENT
Start: 2019-06-04 | End: 2020-01-10 | Stop reason: SDUPTHER

## 2019-06-04 RX ORDER — DONEPEZIL HYDROCHLORIDE 10 MG/1
TABLET, FILM COATED ORAL
Qty: 90 TABLET | Refills: 1 | Status: SHIPPED | OUTPATIENT
Start: 2019-06-04 | End: 2020-01-10 | Stop reason: SDUPTHER

## 2019-07-02 LAB
ALBUMIN SERPL-MCNC: 4.59 G/DL (ref 3.5–5.2)
ALBUMIN/GLOB SERPL: 1.6 G/DL
ALP SERPL-CCNC: 116 U/L (ref 39–117)
AST SERPL-CCNC: 28 U/L (ref 1–32)
BILIRUB SERPL-MCNC: 0.6 MG/DL (ref 0.2–1.2)
BUN SERPL-MCNC: 18 MG/DL (ref 8–23)
BUN/CREAT SERPL: 20.5 (ref 7–25)
CALCIUM SERPL-MCNC: 9.4 MG/DL (ref 8.6–10.5)
CHLORIDE SERPL-SCNC: 104 MMOL/L (ref 98–107)
CHOLEST SERPL-MCNC: 164 MG/DL (ref 0–200)
CREAT SERPL-MCNC: 0.88 MG/DL (ref 0.57–1)
FOLATE SERPL-MCNC: 8.3 NG/ML (ref 3.2–20)
GLOBULIN SER CALC-MCNC: 2.9 G/DL
GLUCOSE SERPL-MCNC: 105 MG/DL (ref 65–99)
HDLC SERPL-MCNC: 53 MG/DL (ref 40–60)
LDLC SERPL CALC-MCNC: 77 MG/DL (ref 0–99)
POTASSIUM SERPL-SCNC: 4.3 MMOL/L (ref 3.5–5.2)
PROT SERPL-MCNC: 7.5 G/DL (ref 6–8.5)
SODIUM SERPL-SCNC: 139 MMOL/L (ref 136–145)
TRIGL SERPL-MCNC: 172 MG/DL (ref 0–150)
VIT B12 SERPL-MCNC: 918 PG/ML (ref 211–911)
VLDLC SERPL CALC-MCNC: 34 MG/DL

## 2019-09-03 RX ORDER — PRAVASTATIN SODIUM 20 MG
TABLET ORAL
Qty: 90 TABLET | Refills: 10 | Status: SHIPPED | OUTPATIENT
Start: 2019-09-03 | End: 2021-08-06 | Stop reason: SDUPTHER

## 2019-09-05 RX ORDER — MEMANTINE HYDROCHLORIDE 14 MG/1
14 CAPSULE, EXTENDED RELEASE ORAL DAILY
Qty: 90 CAPSULE | Refills: 1 | Status: SHIPPED | OUTPATIENT
Start: 2019-09-05 | End: 2020-01-10 | Stop reason: SDUPTHER

## 2019-09-13 ENCOUNTER — OFFICE VISIT (OUTPATIENT)
Dept: INTERNAL MEDICINE | Facility: CLINIC | Age: 79
End: 2019-09-13

## 2019-09-13 ENCOUNTER — LAB REQUISITION (OUTPATIENT)
Dept: LAB | Facility: HOSPITAL | Age: 79
End: 2019-09-13

## 2019-09-13 VITALS
WEIGHT: 165 LBS | HEART RATE: 104 BPM | DIASTOLIC BLOOD PRESSURE: 74 MMHG | SYSTOLIC BLOOD PRESSURE: 132 MMHG | BODY MASS INDEX: 27.49 KG/M2 | HEIGHT: 65 IN

## 2019-09-13 DIAGNOSIS — E78.2 MIXED HYPERLIPIDEMIA: Chronic | ICD-10-CM

## 2019-09-13 DIAGNOSIS — I49.3 FREQUENT UNIFOCAL PVCS: Chronic | ICD-10-CM

## 2019-09-13 DIAGNOSIS — F32.1 DEPRESSION, MAJOR, SINGLE EPISODE, MODERATE (HCC): Chronic | ICD-10-CM

## 2019-09-13 DIAGNOSIS — F02.80 LATE ONSET ALZHEIMER'S DISEASE WITHOUT BEHAVIORAL DISTURBANCE (HCC): Chronic | ICD-10-CM

## 2019-09-13 DIAGNOSIS — Z00.00 ROUTINE GENERAL MEDICAL EXAMINATION AT A HEALTH CARE FACILITY: ICD-10-CM

## 2019-09-13 DIAGNOSIS — R26.9 GAIT DISTURBANCE: Chronic | ICD-10-CM

## 2019-09-13 DIAGNOSIS — G30.1 LATE ONSET ALZHEIMER'S DISEASE WITHOUT BEHAVIORAL DISTURBANCE (HCC): Chronic | ICD-10-CM

## 2019-09-13 DIAGNOSIS — Z00.00 MEDICARE ANNUAL WELLNESS VISIT, SUBSEQUENT: Primary | ICD-10-CM

## 2019-09-13 DIAGNOSIS — Z91.81 RISK FOR FALLS: Chronic | ICD-10-CM

## 2019-09-13 PROBLEM — K21.9 GASTROESOPHAGEAL REFLUX DISEASE WITHOUT ESOPHAGITIS: Status: RESOLVED | Noted: 2019-01-04 | Resolved: 2019-09-13

## 2019-09-13 PROCEDURE — 99397 PER PM REEVAL EST PAT 65+ YR: CPT | Performed by: INTERNAL MEDICINE

## 2019-09-13 PROCEDURE — 96160 PT-FOCUSED HLTH RISK ASSMT: CPT | Performed by: INTERNAL MEDICINE

## 2019-09-13 PROCEDURE — 36415 COLL VENOUS BLD VENIPUNCTURE: CPT | Performed by: INTERNAL MEDICINE

## 2019-09-13 PROCEDURE — G0439 PPPS, SUBSEQ VISIT: HCPCS | Performed by: INTERNAL MEDICINE

## 2019-09-13 PROCEDURE — 93000 ELECTROCARDIOGRAM COMPLETE: CPT | Performed by: INTERNAL MEDICINE

## 2019-09-13 RX ORDER — ESCITALOPRAM OXALATE 20 MG/1
20 TABLET ORAL DAILY
Qty: 30 TABLET | Refills: 5 | Status: SHIPPED | OUTPATIENT
Start: 2019-09-13 | End: 2019-10-14 | Stop reason: SDUPTHER

## 2019-09-13 NOTE — PATIENT INSTRUCTIONS
Fall Prevention in the Home, Adult  Falls can cause injuries and can affect people from all age groups. There are many simple things that you can do to make your home safe and to help prevent falls. Ask for help when making these changes, if needed.  What actions can I take to prevent falls?  General instructions  · Use good lighting in all rooms. Replace any light bulbs that burn out.  · Turn on lights if it is dark. Use night-lights.  · Place frequently used items in easy-to-reach places. Lower the shelves around your home if necessary.  · Set up furniture so that there are clear paths around it. Avoid moving your furniture around.  · Remove throw rugs and other tripping hazards from the floor.  · Avoid walking on wet floors.  · Fix any uneven floor surfaces.  · Add color or contrast paint or tape to grab bars and handrails in your home. Place contrasting color strips on the first and last steps of stairways.  · When you use a stepladder, make sure that it is completely opened and that the sides are firmly locked. Have someone hold the ladder while you are using it. Do not climb a closed stepladder.  · Be aware of any and all pets.  What can I do in the bathroom?    · Keep the floor dry. Immediately clean up any water that spills onto the floor.  · Remove soap buildup in the tub or shower on a regular basis.  · Use non-skid mats or decals on the floor of the tub or shower.  · Attach bath mats securely with double-sided, non-slip rug tape.  · If you need to sit down while you are in the shower, use a plastic, non-slip stool.  · Install grab bars by the toilet and in the tub and shower. Do not use towel bars as grab bars.  What can I do in the bedroom?  · Make sure that a bedside light is easy to reach.  · Do not use oversized bedding that drapes onto the floor.  · Have a firm chair that has side arms to use for getting dressed.  What can I do in the kitchen?  · Clean up any spills right away.  · If you need to  reach for something above you, use a sturdy step stool that has a grab bar.  · Keep electrical cables out of the way.  · Do not use floor polish or wax that makes floors slippery. If you must use wax, make sure that it is non-skid floor wax.  What can I do in the stairways?  · Do not leave any items on the stairs.  · Make sure that you have a light switch at the top of the stairs and the bottom of the stairs. Have them installed if you do not have them.  · Make sure that there are handrails on both sides of the stairs. Fix handrails that are broken or loose. Make sure that handrails are as long as the stairways.  · Install non-slip stair treads on all stairs in your home.  · Avoid having throw rugs at the top or bottom of stairways, or secure the rugs with carpet tape to prevent them from moving.  · Choose a carpet design that does not hide the edge of steps on the stairway.  · Check any carpeting to make sure that it is firmly attached to the stairs. Fix any carpet that is loose or worn.  What can I do on the outside of my home?  · Use bright outdoor lighting.  · Regularly repair the edges of walkways and driveways and fix any cracks.  · Remove high doorway thresholds.  · Trim any shrubbery on the main path into your home.  · Regularly check that handrails are securely fastened and in good repair. Both sides of any steps should have handrails.  · Install guardrails along the edges of any raised decks or porches.  · Clear walkways of debris and clutter, including tools and rocks.  · Have leaves, snow, and ice cleared regularly.  · Use sand or salt on walkways during winter months.  · In the garage, clean up any spills right away, including grease or oil spills.  What other actions can I take?  · Wear closed-toe shoes that fit well and support your feet. Wear shoes that have rubber soles or low heels.  · Use mobility aids as needed, such as canes, walkers, scooters, and crutches.  · Review your medicines with your  health care provider. Some medicines can cause dizziness or changes in blood pressure, which increase your risk of falling.  Talk with your health care provider about other ways that you can decrease your risk of falls. This may include working with a physical therapist or  to improve your strength, balance, and endurance.  Where to find more information  · Centers for Disease Control and Prevention, [place]: https://www.cdc.gov  · National [place] [employer] Aging: https://om7aqrr.romy.nih.gov  Contact a health care provider if:  · You are afraid of falling at home.  · You feel weak, drowsy, or dizzy at home.  · You fall at home.  Summary  · There are many simple things that you can do to make your home safe and to help prevent falls.  · Ways to make your home safe include removing tripping hazards and installing grab bars in the bathroom.  · Ask for help when making these changes in your home.  This information is not intended to replace advice given to you by your health care provider. Make sure you discuss any questions you have with your health care provider.  Document Released: 12/08/2003 Document Revised: 08/02/2018 Document Reviewed: 08/02/2018  [employer] Interactive Patient Education © 2019 [employer]    Dementia Caregiver Guide  Dementia is a term used to describe a number of symptoms that affect memory and thinking. The most common symptoms include:  · Memory loss.  · Trouble with language and communication.  · Trouble concentrating.  · Poor judgment.  · Problems with reasoning.  · Child-like behavior and language.  · Extreme anxiety.  · Angry outbursts.  · Wandering from home or public places.  Dementia usually gets worse slowly over time. In the early stages, people with dementia can stay independent and safe with some help. In later stages, they need help with daily tasks such as dressing, grooming, and using the bathroom.  How to help the person with dementia cope  Dementia can be frightening and  confusing. Here are some tips to help the person with dementia cope with changes caused by the disease.  General tips  · Keep the person on track with his or her routine.  · Try to identify areas where the person may need help.  · Be supportive, patient, calm, and encouraging.  · Gently remind the person that adjusting to changes takes time.  · Help with the tasks that the person has asked for help with.  · Keep the person involved in daily tasks and decisions as much as possible.  · Encourage conversation, but try not to get frustrated or harried if the person struggles to find words or does not seem to appreciate your help.  Communication tips  · When the person is talking or seems frustrated, make eye contact and hold the person's hand.  · Ask specific questions that need yes or no answers.  · Use simple words, short sentences, and a calm voice. Only give one direction at a time.  · When offering choices, limit them to just 1 or 2.  · Avoid correcting the person in a negative way.  · If the person is struggling to find the right words, gently try to help him or her.  How to recognize symptoms of stress  Symptoms of stress in caregivers include:  · Feeling frustrated or angry with the person with dementia.  · Denying that the person has dementia or that his or her symptoms will not improve.  · Feeling hopeless and unappreciated.  · Difficulty sleeping.  · Difficulty concentrating.  · Feeling anxious, irritable, or depressed.  · Developing stress-related health problems.  · Feeling like you have too little time for your own life.  Follow these instructions at home:    · Make sure that you and the person you are caring for:  ? Get regular sleep.  ? Exercise regularly.  ? Eat regular, nutritious meals.  ? Drink enough fluid to keep your urine clear or pale yellow.  ? Take over-the-counter and prescription medicines only as told by your health care providers.  ? Attend all scheduled health care appointments.  · Join a  support group with others who are caregivers.  · Ask about respite care resources so that you can have a regular break from the stress of caregiving.  · Look for signs of stress in yourself and in the person you are caring for. If you notice signs of stress, take steps to manage it.  · Consider any safety risks and take steps to avoid them.  · Organize medications in a pill box for each day of the week.  · Create a plan to handle any legal or financial matters. Get legal or financial advice if needed.  · Keep a calendar in a central location to remind the person of appointments or other activities.  Tips for reducing the risk of injury  · Keep floors clear of clutter. Remove rugs, magazine racks, and floor lamps.  · Keep hallways well lit, especially at night.  · Put a handrail and nonslip mat in the bathtub or shower.  · Put childproof locks on cabinets that contain dangerous items, such as medicines, alcohol, guns, toxic cleaning items, sharp tools or utensils, matches, and lighters.  · Put the locks in places where the person cannot see or reach them easily. This will help ensure that the person does not wander out of the house and get lost.  · Be prepared for emergencies. Keep a list of emergency phone numbers and addresses in a convenient area.  · Remove car keys and lock garage doors so that the person does not try to get in the car and drive.  · Have the person wear a bracelet that tracks locations and identifies the person as having memory problems. This should be worn at all times for safety.  Where to find support:  Many individuals and organizations offer support. These include:  · Support groups for people with dementia and for caregivers.  · Counselors or therapists.  · Home health care services.  · Adult day care centers.  Where to find more information  [employer] Association: www.alz.org  Contact a health care provider if:  · The person's health is rapidly getting worse.  · You are no longer able to  care for the person.  · Caring for the person is affecting your physical and emotional health.  · The person threatens himself or herself, you, or anyone else.  Summary  · Dementia is a term used to describe a number of symptoms that affect memory and thinking.  · Dementia usually gets worse slowly over time.  · Take steps to reduce the person's risk of injury, and to plan for future care.  · Caregivers need support, relief from caregiving, and time for their own lives.  This information is not intended to replace advice given to you by your health care provider. Make sure you discuss any questions you have with your health care provider.  Document Released: 11/21/2017 Document Revised: 11/21/2017 Document Reviewed: 11/21/2017  [employer] Interactive Patient Education © 2019 [employer]    Exercises To Do While Sitting    Exercises that you do while sitting (chair exercises) can give you many of the same benefits as full exercise. Benefits include strengthening your heart, burning calories, and keeping muscles and joints healthy. Exercise can also improve your mood and help with depression and anxiety.  You may benefit from chair exercises if you are unable to do standing exercises because of:  · Diabetic foot pain.  · Obesity.  · Illness.  · Arthritis.  · Recovery from surgery or injury.  · Breathing problems.  · Balance problems.  · Another type of disability.  Before starting chair exercises, check with your health care provider or a physical therapist to find out how much exercise you can tolerate and which exercises are safe for you. If your health care provider approves:  · Start out slowly and build up over time. Aim to work up to about 10-20 minutes for each exercise session.  · Make exercise part of your daily routine.  · Drink water when you exercise. Do not wait until you are thirsty. Drink every 10-15 minutes.  · Stop exercising right away if you have pain, nausea, shortness of breath, or  "dizziness.  · If you are exercising in a wheelchair, make sure to lock the wheels.  · Ask your health care provider whether you can do jeni chi or yoga. Many positions in these mind-body exercises can be modified to do while seated.  Warm-up  Before starting other exercises:  1. Sit up as straight as you can. Have your knees bent at 90 degrees, which is the shape of the capital letter \"L.\" Keep your feet flat on the floor.  2. Sit at the front edge of your chair, if you can.  3. Pull in (tighten) the muscles in your abdomen and stretch your spine and neck as straight as you can. Hold this position for a few minutes.  4. Breathe in and out evenly. Try to concentrate on your breathing, and relax your mind.  Stretching  Exercise A: Arm stretch  1. Hold your arms out straight in front of your body.  2. Bend your hands at the wrist with your fingers pointing up, as if signaling someone to stop. Notice the slight tension in your forearms as you hold the position.  3. Keeping your arms out and your hands bent, rotate your hands outward as far as you can and hold this stretch. Aim to have your thumbs pointing up and your pinkie fingers pointing down.  Slowly repeat arm stretches for one minute as tolerated.  Exercise B: Leg stretch  1. If you can move your legs, try to \"draw\" letters on the floor with the toes of your foot. Write your name with one foot.  2. Write your name with the toes of your other foot.  Slowly repeat the movements for one minute as tolerated.  Exercise C: Reach for the lucio  1. Reach your hands as far over your head as you can to stretch your spine.  2. Move your hands and arms as if you are climbing a rope.  Slowly repeat the movements for one minute as tolerated.  Range of motion exercises  Exercise A: Shoulder roll  1. Let your arms hang loosely at your sides.  2. Lift just your shoulders up toward your ears, then let them relax back down.  3. When your shoulders feel loose, rotate your shoulders in " backward and forward circles.  Do shoulder rolls slowly for one minute as tolerated.  Exercise B: March in place  1. As if you are marching, pump your arms and lift your legs up and down. Lift your knees as high as you can.  ? If you are unable to lift your knees, just pump your arms and move your ankles and feet up and down.  March in place for one minute as tolerated.  Exercise C: Seated jumping jacks  1. Let your arms hang down straight.  2. Keeping your arms straight, lift them up over your head. Aim to point your fingers to the ceiling.  3. While you lift your arms, straighten your legs and slide your heels along the floor to your sides, as wide as you can.  4. As you bring your arms back down to your sides, slide your legs back together.  ? If you are unable to use your legs, just move your arms.  Slowly repeat seated jumping jacks for one minute as tolerated.  Strengthening exercises  Exercise A: Shoulder squeeze  1. Hold your arms straight out from your body to your sides, with your elbows bent and your fists pointed at the ceiling.  2. Keeping your arms in the bent position, move them forward so your elbows and forearms meet in front of your face.  3. Open your arms back out as wide as you can with your elbows still bent, until you feel your shoulder blades squeezing together. Hold for 5 seconds.  Slowly repeat the movements forward and backward for one minute as tolerated.  Contact a health care provider if you:  · Had to stop exercising due to any of the following:  ? Pain.  ? Nausea.  ? Shortness of breath.  ? Dizziness.  ? Fatigue.  · Have significant pain or soreness after exercising.  Get help right away if you have:  · Chest pain.  · Difficulty breathing.  These symptoms may represent a serious problem that is an emergency. Do not wait to see if the symptoms will go away. Get medical help right away. Call your local emergency services (911 in the [place] Do not drive yourself to the hospital.  This  information is not intended to replace advice given to you by your health care provider. Make sure you discuss any questions you have with your health care provider.  Document Released: 10/31/2018 Document Revised: 10/31/2018 Document Reviewed: 10/31/2018  [employer] Interactive Patient Education © 2019 [employer]

## 2019-09-13 NOTE — PROGRESS NOTES
The ABCs of the Annual Wellness Visit  Subsequent Medicare Wellness Visit    Chief Complaint   Patient presents with   • Medicare Wellness-subsequent     She is fasting   • Hyperlipidemia   • Alzheimer's Disease   Patient is here with her daughter who takes care of her.    Subjective   History of Present Illness:  Jia Bright is a 78 y.o. female who presents for a Subsequent Medicare Wellness Visit and f/u chronic conditions including depression,dementia,hyperlipidemia .    CHRONIC CONDITIONS:    Depression is still present. She does join the family watching TV,etc. She is not withdrawn.  She does feel down. She is not suicidal. She stumbles on and off. She has been unable to comprehend how to use a walker. Unable to learn from PT.  She uses furniture in the house to steady her.  Uses her daughter's arm as well.    Dementia is fairly stable. She does not wander at night,or ever try to use stove or microwave, or leave water running.No behavioural issues. Sabine is at home with her most of the time when daughter is not there.     She does not feel palpitations or PVCs.      HEALTH RISK ASSESSMENT    Recent Hospitalizations:  No hospitalization(s) within the last year.    Current Medical Providers:  Patient Care Team:  Estephania Wolfe MD as PCP - General (Internal Medicine)    Smoking Status:  Social History     Tobacco Use   Smoking Status Former Smoker   • Types: Cigarettes   • Last attempt to quit: 2017   • Years since quittin.7   Tobacco Comment    4 cig a day        Alcohol Consumption:  Social History     Substance and Sexual Activity   Alcohol Use No   • Frequency: Never       Depression Screen:   PHQ-2/PHQ-9 Depression Screening 2019   Little interest or pleasure in doing things 1   Feeling down, depressed, or hopeless 1   Total Score 2     PHQ-9 and ACE III mini not performed due to patient's dementia and inability to cooperate.    Fall Risk Screen:  EMMANUEL Fall Risk Assessment was  completed, and patient is at HIGH risk for falls. Assessment completed on:9/13/2019    Health Habits and Functional and Cognitive Screening:  Functional & Cognitive Status 9/13/2019   Do you have difficulty preparing food and eating? No   Do you have difficulty bathing yourself, getting dressed or grooming yourself? Yes   Do you have difficulty using the toilet? No   Do you have difficulty moving around from place to place? Yes   Do you have trouble with steps or getting out of a bed or a chair? Yes   Current Diet Well Balanced Diet   Dental Exam Not up to date   Eye Exam Not up to date   Exercise (times per week) 0 times per week   Current Exercise Activities Include None   Do you need help using the phone?  Yes   Are you deaf or do you have serious difficulty hearing?  Yes   Do you need help with transportation? Yes   Do you need help shopping? Yes   Do you need help preparing meals?  Yes   Do you need help with housework?  No   Do you need help with laundry? Yes   Do you need help taking your medications? Yes   Do you need help managing money? Yes   Do you ever drive or ride in a car without wearing a seat belt? No   Have you felt unusual stress, anger or loneliness in the last month? No   If you need help, do you have trouble finding someone available to you? No   Have you been bothered in the last four weeks by sexual problems? No   Do you have difficulty concentrating, remembering or making decisions? Yes         Does the patient have evidence of cognitive impairment? Yes    Asprin use counseling:Does not need ASA (and currently is not on it) Her fall risk is too high to be on it.    Age-appropriate Screening Schedule:  Refer to the list below for future screening recommendations based on patient's age, sex and/or medical conditions. Orders for these recommended tests are listed in the plan section. The patient has been provided with a written plan.    Age appropriate preventive counseling done including age  appropriate vaccines,   regular dental visits, mental health, injury prevention such as wearing seat belt and preventing falls, healthy  nutrition, healthy weight, regular physical exercise. Alcohol use is moderate.  Tobacco history-none. Drug use-none.  STD's-not at risk.    Mammogram,Pap smear, colonoscopy screenings not indicated due to patient's dementia and lack of benefit.    Health Maintenance   Topic Date Due   • TDAP/TD VACCINES (1 - Tdap) 11/16/1960   • ZOSTER VACCINE (1 of 2) 11/16/1991   • PNEUMOCOCCAL VACCINES (65+ LOW/MEDIUM RISK) (2 of 2 - PPSV23) 06/26/2018   • INFLUENZA VACCINE  08/01/2019   • LIPID PANEL  01/25/2020          The following portions of the patient's history were reviewed and updated as appropriate: allergies, current medications, past family history, past medical history, past social history, past surgical history and problem list.    Outpatient Medications Prior to Visit   Medication Sig Dispense Refill   • Calcium Carb-Cholecalciferol (OS-OSCAR CALCIUM + D3) 500-200 MG-UNIT tablet Take 1 tablet by mouth Daily. 60 tablet 11   • Cyanocobalamin (B-12) 1000 MCG tablet TAKE ONE TABLET BY MOUTH DAILY 90 tablet 1   • donepezil (ARICEPT) 10 MG tablet TAKE ONE TABLET BY MOUTH EVERY EVENING 90 tablet 1   • memantine (NAMENDA XR) 14 MG capsule sustained-release 24 hr extended release capsule Take 1 capsule by mouth Daily. 90 capsule 1   • MULTIPLE VITAMIN PO Take 1 tablet by mouth Daily.     • nystatin (MYCOSTATIN) 077548 UNIT/GM cream apply by topical route 2 times every day to the affected area(s)     • pravastatin (PRAVACHOL) 20 MG tablet TAKE ONE TABLET BY MOUTH EVERY EVENING 90 tablet 10   • escitalopram (LEXAPRO) 10 MG tablet Take 1 tablet by mouth Daily. 30 tablet 5     No facility-administered medications prior to visit.        Patient Active Problem List   Diagnosis   • Late onset Alzheimer's disease without behavioral disturbance   • Risk for falls   • Gait disturbance   • Mixed  "hyperlipidemia   • Depression, major, single episode, moderate (CMS/HCC)   • B12 deficiency   • Shortness of breath on exertion   • Menopausal and perimenopausal disorder   • Visit for screening mammogram   • Frequent unifocal PVCs       Advanced Care Planning:  patient with dementia    Review of Systems   Constitutional: Negative for chills, fatigue and fever.   HENT: Negative for congestion, ear pain and sinus pressure.    Respiratory: Positive for shortness of breath. Negative for cough, chest tightness and wheezing.         Short of breath only when walks long way. Not when walking around the house. She rarely ever goes out of house.   Cardiovascular: Negative for chest pain, palpitations and leg swelling.   Gastrointestinal: Negative for abdominal pain, blood in stool and constipation.   Genitourinary: Negative for frequency and hematuria.   Musculoskeletal: Positive for gait problem. Negative for arthralgias and back pain.   Skin: Negative for color change.   Allergic/Immunologic: Negative for environmental allergies and food allergies.   Neurological: Negative for dizziness, speech difficulty, weakness, light-headedness and headaches.   Hematological: Negative for adenopathy. Does not bruise/bleed easily.   Psychiatric/Behavioral: Positive for dysphoric mood. Negative for sleep disturbance. The patient is not nervous/anxious.        Compared to one year ago, the patient feels her physical health is worse.  Compared to one year ago, the patient feels her mental health is worse.    Reviewed chart for potential of high risk medication in the elderly: yes  Reviewed chart for potential of harmful drug interactions in the elderly:yes    Objective         Vitals:    09/13/19 1253   BP: 132/74   BP Location: Left arm   Patient Position: Sitting   Cuff Size: Adult   Pulse: 104   Weight: 74.8 kg (165 lb)   Height: 165.1 cm (65\")   PainSc: 0-No pain     Body mass index is 27.46 kg/m².    Body mass index is 27.46 " kg/m².  Discussed the patient's BMI with her. The BMI is in the acceptable range.    Physical Exam   Constitutional: She is oriented to person, place, and time. She appears well-developed and well-nourished.   HENT:   Head: Normocephalic.   Eyes: Conjunctivae and EOM are normal. Pupils are equal, round, and reactive to light.   Neck: Normal range of motion. Neck supple. No thyromegaly present.   Cardiovascular: Normal rate, normal heart sounds and intact distal pulses. A regularly irregular rhythm present.   Pulmonary/Chest: Effort normal and breath sounds normal. She has no wheezes.   Abdominal: Soft. Bowel sounds are normal. There is no tenderness.   Musculoskeletal: Normal range of motion. She exhibits no edema or tenderness.   Lymphadenopathy:     She has no cervical adenopathy.     She has no axillary adenopathy.   Neurological: She is alert and oriented to person, place, and time. She has normal strength. No cranial nerve deficit or sensory deficit. Gait abnormal. Coordination normal.   Skin: Skin is warm and dry. No rash noted.   Psychiatric: She has a normal mood and affect. Her speech is normal. Thought content normal. She is slowed. Thought content is not paranoid. Cognition and memory are impaired. She expresses no homicidal and no suicidal ideation. She exhibits abnormal recent memory and abnormal remote memory.   Nursing note and vitals reviewed.          ECG 12 Lead  Date/Time: 9/13/2019 1:55 PM  Performed by: Estephania Wolfe MD  Authorized by: Estephania Wolfe MD   Comparison: compared with previous ECG   Similar to previous ECG  Rhythm: sinus tachycardia  Ectopy: unifocal PVCs  Rate: tachycardic  BPM: 116  Conduction: conduction normal  ST Segments: ST segments normal  T Waves: T waves normal  QRS axis: normal    Clinical impression: abnormal EKG            Lab Results   Component Value Date     (H) 09/13/2019        Assessment/Plan   Medicare Risks and Personalized Health Plan  CMS  Preventative Services Quick Reference  Fall Risk    The above risks/problems have been discussed with the patient.  Pertinent information has been shared with the patient in the After Visit Summary.  Follow up plans and orders are seen below in the Assessment/Plan Section.    Diagnoses and all orders for this visit:    1. Medicare annual wellness visit, subsequent (Primary)    2. Depression, major, single episode, moderate (CMS/HCC)  Comments:  Increase Lexapro (escitalopram) to 20 mg tablet daily.  Continue encouraging interaction with the rest of the family.  Orders:  -     escitalopram (LEXAPRO) 20 MG tablet; Take 1 tablet by mouth Daily.  Dispense: 30 tablet; Refill: 5    3. Late onset Alzheimer's disease without behavioral disturbance  Comments:  Continue memantine and donepezil.  Continue to encourage interaction with other people and physical activity  Orders:  -     Vitamin B12; Future  -     Vitamin B12    4. Mixed hyperlipidemia  Comments:  Continue pravastatin every evening.  Continue to avoid sugars and fats in the diet.  Try to do some exercises with the arms and legs while seated at home.  Orders:  -     CBC & Differential; Future  -     Comprehensive Metabolic Panel; Future  -     TSH; Future  -     Urinalysis without microscopic (no culture) - Urine, Clean Catch; Future  -     Urinalysis without microscopic (no culture) -  -     TSH  -     Comprehensive Metabolic Panel  -     CBC & Differential    5. Gait disturbance  Comments:  Continue to be very careful.  Continue to keep the walking areas in the house clear of clutter and things that she might fall over.    6. Risk for falls  Comments:  see above.      7. Frequent unifocal PVCs  Comments:  Continue to avoid caffeine and chocolate in the diet.  Patient is asymptomatic.  We will watch.  Orders:  -     ECG 12 Lead    Other orders  -     Specimen Status Report    Patient Instructions     Fall Prevention in the Home, Adult  Falls can cause injuries  and can affect people from all age groups. There are many simple things that you can do to make your home safe and to help prevent falls. Ask for help when making these changes, if needed.  What actions can I take to prevent falls?  General instructions  · Use good lighting in all rooms. Replace any light bulbs that burn out.  · Turn on lights if it is dark. Use night-lights.  · Place frequently used items in easy-to-reach places. Lower the shelves around your home if necessary.  · Set up furniture so that there are clear paths around it. Avoid moving your furniture around.  · Remove throw rugs and other tripping hazards from the floor.  · Avoid walking on wet floors.  · Fix any uneven floor surfaces.  · Add color or contrast paint or tape to grab bars and handrails in your home. Place contrasting color strips on the first and last steps of stairways.  · When you use a stepladder, make sure that it is completely opened and that the sides are firmly locked. Have someone hold the ladder while you are using it. Do not climb a closed stepladder.  · Be aware of any and all pets.  What can I do in the bathroom?    · Keep the floor dry. Immediately clean up any water that spills onto the floor.  · Remove soap buildup in the tub or shower on a regular basis.  · Use non-skid mats or decals on the floor of the tub or shower.  · Attach bath mats securely with double-sided, non-slip rug tape.  · If you need to sit down while you are in the shower, use a plastic, non-slip stool.  · Install grab bars by the toilet and in the tub and shower. Do not use towel bars as grab bars.  What can I do in the bedroom?  · Make sure that a bedside light is easy to reach.  · Do not use oversized bedding that drapes onto the floor.  · Have a firm chair that has side arms to use for getting dressed.  What can I do in the kitchen?  · Clean up any spills right away.  · If you need to reach for something above you, use a sturdy step stool that has a  grab bar.  · Keep electrical cables out of the way.  · Do not use floor polish or wax that makes floors slippery. If you must use wax, make sure that it is non-skid floor wax.  What can I do in the stairways?  · Do not leave any items on the stairs.  · Make sure that you have a light switch at the top of the stairs and the bottom of the stairs. Have them installed if you do not have them.  · Make sure that there are handrails on both sides of the stairs. Fix handrails that are broken or loose. Make sure that handrails are as long as the stairways.  · Install non-slip stair treads on all stairs in your home.  · Avoid having throw rugs at the top or bottom of stairways, or secure the rugs with carpet tape to prevent them from moving.  · Choose a carpet design that does not hide the edge of steps on the stairway.  · Check any carpeting to make sure that it is firmly attached to the stairs. Fix any carpet that is loose or worn.  What can I do on the outside of my home?  · Use bright outdoor lighting.  · Regularly repair the edges of walkways and driveways and fix any cracks.  · Remove high doorway thresholds.  · Trim any shrubbery on the main path into your home.  · Regularly check that handrails are securely fastened and in good repair. Both sides of any steps should have handrails.  · Install guardrails along the edges of any raised decks or porches.  · Clear walkways of debris and clutter, including tools and rocks.  · Have leaves, snow, and ice cleared regularly.  · Use sand or salt on walkways during winter months.  · In the garage, clean up any spills right away, including grease or oil spills.  What other actions can I take?  · Wear closed-toe shoes that fit well and support your feet. Wear shoes that have rubber soles or low heels.  · Use mobility aids as needed, such as canes, walkers, scooters, and crutches.  · Review your medicines with your health care provider. Some medicines can cause dizziness or changes  in blood pressure, which increase your risk of falling.  Talk with your health care provider about other ways that you can decrease your risk of falls. This may include working with a physical therapist or  to improve your strength, balance, and endurance.  Where to find more information  · Centers for Disease Control and Prevention, [place]: https://www.cdc.gov  · National Smoot on Aging: https://jb3pswx.romy.nih.gov  Contact a health care provider if:  · You are afraid of falling at home.  · You feel weak, drowsy, or dizzy at home.  · You fall at home.  Summary  · There are many simple things that you can do to make your home safe and to help prevent falls.  · Ways to make your home safe include removing tripping hazards and installing grab bars in the bathroom.  · Ask for help when making these changes in your home.  This information is not intended to replace advice given to you by your health care provider. Make sure you discuss any questions you have with your health care provider.  Document Released: 12/08/2003 Document Revised: 08/02/2018 Document Reviewed: 08/02/2018  [employer] Interactive Patient Education © 2019 [employer]    Dementia Caregiver Guide  Dementia is a term used to describe a number of symptoms that affect memory and thinking. The most common symptoms include:  · Memory loss.  · Trouble with language and communication.  · Trouble concentrating.  · Poor judgment.  · Problems with reasoning.  · Child-like behavior and language.  · Extreme anxiety.  · Angry outbursts.  · Wandering from home or public places.  Dementia usually gets worse slowly over time. In the early stages, people with dementia can stay independent and safe with some help. In later stages, they need help with daily tasks such as dressing, grooming, and using the bathroom.  How to help the person with dementia cope  Dementia can be frightening and confusing. Here are some tips to help the person with dementia cope  with changes caused by the disease.  General tips  · Keep the person on track with his or her routine.  · Try to identify areas where the person may need help.  · Be supportive, patient, calm, and encouraging.  · Gently remind the person that adjusting to changes takes time.  · Help with the tasks that the person has asked for help with.  · Keep the person involved in daily tasks and decisions as much as possible.  · Encourage conversation, but try not to get frustrated or harried if the person struggles to find words or does not seem to appreciate your help.  Communication tips  · When the person is talking or seems frustrated, make eye contact and hold the person's hand.  · Ask specific questions that need yes or no answers.  · Use simple words, short sentences, and a calm voice. Only give one direction at a time.  · When offering choices, limit them to just 1 or 2.  · Avoid correcting the person in a negative way.  · If the person is struggling to find the right words, gently try to help him or her.  How to recognize symptoms of stress  Symptoms of stress in caregivers include:  · Feeling frustrated or angry with the person with dementia.  · Denying that the person has dementia or that his or her symptoms will not improve.  · Feeling hopeless and unappreciated.  · Difficulty sleeping.  · Difficulty concentrating.  · Feeling anxious, irritable, or depressed.  · Developing stress-related health problems.  · Feeling like you have too little time for your own life.  Follow these instructions at home:    · Make sure that you and the person you are caring for:  ? Get regular sleep.  ? Exercise regularly.  ? Eat regular, nutritious meals.  ? Drink enough fluid to keep your urine clear or pale yellow.  ? Take over-the-counter and prescription medicines only as told by your health care providers.  ? Attend all scheduled health care appointments.  · Join a support group with others who are caregivers.  · Ask about respite  care resources so that you can have a regular break from the stress of caregiving.  · Look for signs of stress in yourself and in the person you are caring for. If you notice signs of stress, take steps to manage it.  · Consider any safety risks and take steps to avoid them.  · Organize medications in a pill box for each day of the week.  · Create a plan to handle any legal or financial matters. Get legal or financial advice if needed.  · Keep a calendar in a central location to remind the person of appointments or other activities.  Tips for reducing the risk of injury  · Keep floors clear of clutter. Remove rugs, magazine racks, and floor lamps.  · Keep hallways well lit, especially at night.  · Put a handrail and nonslip mat in the bathtub or shower.  · Put childproof locks on cabinets that contain dangerous items, such as medicines, alcohol, guns, toxic cleaning items, sharp tools or utensils, matches, and lighters.  · Put the locks in places where the person cannot see or reach them easily. This will help ensure that the person does not wander out of the house and get lost.  · Be prepared for emergencies. Keep a list of emergency phone numbers and addresses in a convenient area.  · Remove car keys and lock garage doors so that the person does not try to get in the car and drive.  · Have the person wear a bracelet that tracks locations and identifies the person as having memory problems. This should be worn at all times for safety.  Where to find support:  Many individuals and organizations offer support. These include:  · Support groups for people with dementia and for caregivers.  · Counselors or therapists.  · Home health care services.  · Adult day care centers.  Where to find more information  Alzheimer's Association: www.alz.org  Contact a health care provider if:  · The person's health is rapidly getting worse.  · You are no longer able to care for the person.  · Caring for the person is affecting your  physical and emotional health.  · The person threatens himself or herself, you, or anyone else.  Summary  · Dementia is a term used to describe a number of symptoms that affect memory and thinking.  · Dementia usually gets worse slowly over time.  · Take steps to reduce the person's risk of injury, and to plan for future care.  · Caregivers need support, relief from caregiving, and time for their own lives.  This information is not intended to replace advice given to you by your health care provider. Make sure you discuss any questions you have with your health care provider.  Document Released: 11/21/2017 Document Revised: 11/21/2017 Document Reviewed: 11/21/2017  [employer] Interactive Patient Education © 2019 [employer]    Exercises To Do While Sitting    Exercises that you do while sitting (chair exercises) can give you many of the same benefits as full exercise. Benefits include strengthening your heart, burning calories, and keeping muscles and joints healthy. Exercise can also improve your mood and help with depression and anxiety.  You may benefit from chair exercises if you are unable to do standing exercises because of:  · Diabetic foot pain.  · Obesity.  · Illness.  · Arthritis.  · Recovery from surgery or injury.  · Breathing problems.  · Balance problems.  · Another type of disability.  Before starting chair exercises, check with your health care provider or a physical therapist to find out how much exercise you can tolerate and which exercises are safe for you. If your health care provider approves:  · Start out slowly and build up over time. Aim to work up to about 10-20 minutes for each exercise session.  · Make exercise part of your daily routine.  · Drink water when you exercise. Do not wait until you are thirsty. Drink every 10-15 minutes.  · Stop exercising right away if you have pain, nausea, shortness of breath, or dizziness.  · If you are exercising in a wheelchair, make sure to lock the  "wheels.  · Ask your health care provider whether you can do jeni chi or yoga. Many positions in these mind-body exercises can be modified to do while seated.  Warm-up  Before starting other exercises:  1. Sit up as straight as you can. Have your knees bent at 90 degrees, which is the shape of the capital letter \"L.\" Keep your feet flat on the floor.  2. Sit at the front edge of your chair, if you can.  3. Pull in (tighten) the muscles in your abdomen and stretch your spine and neck as straight as you can. Hold this position for a few minutes.  4. Breathe in and out evenly. Try to concentrate on your breathing, and relax your mind.  Stretching  Exercise A: Arm stretch  1. Hold your arms out straight in front of your body.  2. Bend your hands at the wrist with your fingers pointing up, as if signaling someone to stop. Notice the slight tension in your forearms as you hold the position.  3. Keeping your arms out and your hands bent, rotate your hands outward as far as you can and hold this stretch. Aim to have your thumbs pointing up and your pinkie fingers pointing down.  Slowly repeat arm stretches for one minute as tolerated.  Exercise B: Leg stretch  1. If you can move your legs, try to \"draw\" letters on the floor with the toes of your foot. Write your name with one foot.  2. Write your name with the toes of your other foot.  Slowly repeat the movements for one minute as tolerated.  Exercise C: Reach for the lucio  1. Reach your hands as far over your head as you can to stretch your spine.  2. Move your hands and arms as if you are climbing a rope.  Slowly repeat the movements for one minute as tolerated.  Range of motion exercises  Exercise A: Shoulder roll  1. Let your arms hang loosely at your sides.  2. Lift just your shoulders up toward your ears, then let them relax back down.  3. When your shoulders feel loose, rotate your shoulders in backward and forward circles.  Do shoulder rolls slowly for one minute as " tolerated.  Exercise B: March in place  1. As if you are marching, pump your arms and lift your legs up and down. Lift your knees as high as you can.  ? If you are unable to lift your knees, just pump your arms and move your ankles and feet up and down.  March in place for one minute as tolerated.  Exercise C: Seated jumping jacks  1. Let your arms hang down straight.  2. Keeping your arms straight, lift them up over your head. Aim to point your fingers to the ceiling.  3. While you lift your arms, straighten your legs and slide your heels along the floor to your sides, as wide as you can.  4. As you bring your arms back down to your sides, slide your legs back together.  ? If you are unable to use your legs, just move your arms.  Slowly repeat seated jumping jacks for one minute as tolerated.  Strengthening exercises  Exercise A: Shoulder squeeze  1. Hold your arms straight out from your body to your sides, with your elbows bent and your fists pointed at the ceiling.  2. Keeping your arms in the bent position, move them forward so your elbows and forearms meet in front of your face.  3. Open your arms back out as wide as you can with your elbows still bent, until you feel your shoulder blades squeezing together. Hold for 5 seconds.  Slowly repeat the movements forward and backward for one minute as tolerated.  Contact a health care provider if you:  · Had to stop exercising due to any of the following:  ? Pain.  ? Nausea.  ? Shortness of breath.  ? Dizziness.  ? Fatigue.  · Have significant pain or soreness after exercising.  Get help right away if you have:  · Chest pain.  · Difficulty breathing.  These symptoms may represent a serious problem that is an emergency. Do not wait to see if the symptoms will go away. Get medical help right away. Call your local emergency services (911 in the [place] Do not drive yourself to the hospital.  This information is not intended to replace advice given to you by your health  care provider. Make sure you discuss any questions you have with your health care provider.  Document Released: 10/31/2018 Document Revised: 10/31/2018 Document Reviewed: 10/31/2018  [employer]vier Interactive Patient Education © 2019 [employer]

## 2019-09-14 LAB
ALBUMIN SERPL-MCNC: 4.3 G/DL (ref 3.5–4.8)
ALBUMIN/GLOB SERPL: 1.4 {RATIO} (ref 1.2–2.2)
ALP SERPL-CCNC: 124 IU/L (ref 39–117)
ALT SERPL-CCNC: 11 IU/L (ref 0–32)
AST SERPL-CCNC: 17 IU/L (ref 0–40)
BASOPHILS # BLD AUTO: 0 X10E3/UL (ref 0–0.2)
BASOPHILS NFR BLD AUTO: 0 %
BILIRUB SERPL-MCNC: 0.5 MG/DL (ref 0–1.2)
BUN SERPL-MCNC: 14 MG/DL (ref 8–27)
BUN/CREAT SERPL: 13 (ref 12–28)
CALCIUM SERPL-MCNC: 10 MG/DL (ref 8.7–10.3)
CHLORIDE SERPL-SCNC: 101 MMOL/L (ref 96–106)
CO2 SERPL-SCNC: 20 MMOL/L (ref 20–29)
CREAT SERPL-MCNC: 1.05 MG/DL (ref 0.57–1)
EOSINOPHIL # BLD AUTO: 0.2 X10E3/UL (ref 0–0.4)
EOSINOPHIL NFR BLD AUTO: 2 %
ERYTHROCYTE [DISTWIDTH] IN BLOOD BY AUTOMATED COUNT: 13.9 % (ref 12.3–15.4)
GLOBULIN SER CALC-MCNC: 3.1 G/DL (ref 1.5–4.5)
GLUCOSE SERPL-MCNC: 114 MG/DL (ref 65–99)
GLUCOSE UR QL: NORMAL
HCT VFR BLD AUTO: 43.6 % (ref 34–46.6)
HGB BLD-MCNC: 14.8 G/DL (ref 11.1–15.9)
IMM GRANULOCYTES # BLD AUTO: 0 X10E3/UL (ref 0–0.1)
IMM GRANULOCYTES NFR BLD AUTO: 0 %
KETONES UR QL STRIP: NORMAL
LYMPHOCYTES # BLD AUTO: 1.6 X10E3/UL (ref 0.7–3.1)
LYMPHOCYTES NFR BLD AUTO: 15 %
MCH RBC QN AUTO: 30.5 PG (ref 26.6–33)
MCHC RBC AUTO-ENTMCNC: 33.9 G/DL (ref 31.5–35.7)
MCV RBC AUTO: 90 FL (ref 79–97)
MONOCYTES # BLD AUTO: 1.1 X10E3/UL (ref 0.1–0.9)
MONOCYTES NFR BLD AUTO: 10 %
NEUTROPHILS # BLD AUTO: 8.1 X10E3/UL (ref 1.4–7)
NEUTROPHILS NFR BLD AUTO: 73 %
PH UR STRIP: NORMAL [PH]
PLATELET # BLD AUTO: 337 X10E3/UL (ref 150–450)
POTASSIUM SERPL-SCNC: 4.5 MMOL/L (ref 3.5–5.2)
PROT SERPL-MCNC: 7.4 G/DL (ref 6–8.5)
PROT UR QL STRIP: NORMAL
RBC # BLD AUTO: 4.85 X10E6/UL (ref 3.77–5.28)
SODIUM SERPL-SCNC: 140 MMOL/L (ref 134–144)
SP GR UR: NORMAL
SPECIMEN STATUS: NORMAL
TSH SERPL DL<=0.005 MIU/L-ACNC: 2.97 UIU/ML (ref 0.45–4.5)
VIT B12 SERPL-MCNC: 367 PG/ML (ref 232–1245)
WBC # BLD AUTO: 11.1 X10E3/UL (ref 3.4–10.8)

## 2019-10-14 DIAGNOSIS — F32.1 DEPRESSION, MAJOR, SINGLE EPISODE, MODERATE (HCC): Chronic | ICD-10-CM

## 2019-10-14 RX ORDER — ESCITALOPRAM OXALATE 20 MG/1
20 TABLET ORAL DAILY
Qty: 90 TABLET | Refills: 1 | Status: SHIPPED | OUTPATIENT
Start: 2019-10-14 | End: 2020-04-16

## 2020-01-10 ENCOUNTER — OFFICE VISIT (OUTPATIENT)
Dept: INTERNAL MEDICINE | Facility: CLINIC | Age: 80
End: 2020-01-10

## 2020-01-10 VITALS
HEART RATE: 90 BPM | WEIGHT: 165 LBS | DIASTOLIC BLOOD PRESSURE: 80 MMHG | SYSTOLIC BLOOD PRESSURE: 124 MMHG | HEIGHT: 65 IN | BODY MASS INDEX: 27.49 KG/M2

## 2020-01-10 DIAGNOSIS — E78.2 MIXED HYPERLIPIDEMIA: Primary | ICD-10-CM

## 2020-01-10 DIAGNOSIS — Z91.81 RISK FOR FALLS: ICD-10-CM

## 2020-01-10 DIAGNOSIS — F32.1 DEPRESSION, MAJOR, SINGLE EPISODE, MODERATE (HCC): ICD-10-CM

## 2020-01-10 DIAGNOSIS — G30.1 LATE ONSET ALZHEIMER'S DISEASE WITHOUT BEHAVIORAL DISTURBANCE (HCC): ICD-10-CM

## 2020-01-10 DIAGNOSIS — R26.9 GAIT DISTURBANCE: ICD-10-CM

## 2020-01-10 DIAGNOSIS — F02.80 LATE ONSET ALZHEIMER'S DISEASE WITHOUT BEHAVIORAL DISTURBANCE (HCC): ICD-10-CM

## 2020-01-10 PROCEDURE — 90732 PPSV23 VACC 2 YRS+ SUBQ/IM: CPT | Performed by: INTERNAL MEDICINE

## 2020-01-10 PROCEDURE — G0009 ADMIN PNEUMOCOCCAL VACCINE: HCPCS | Performed by: INTERNAL MEDICINE

## 2020-01-10 PROCEDURE — 90653 IIV ADJUVANT VACCINE IM: CPT | Performed by: INTERNAL MEDICINE

## 2020-01-10 PROCEDURE — 99214 OFFICE O/P EST MOD 30 MIN: CPT | Performed by: INTERNAL MEDICINE

## 2020-01-10 PROCEDURE — G0008 ADMIN INFLUENZA VIRUS VAC: HCPCS | Performed by: INTERNAL MEDICINE

## 2020-01-10 RX ORDER — MEMANTINE HYDROCHLORIDE 14 MG/1
14 CAPSULE, EXTENDED RELEASE ORAL DAILY
Qty: 90 CAPSULE | Refills: 1 | Status: SHIPPED | OUTPATIENT
Start: 2020-01-10 | End: 2020-09-24

## 2020-01-10 RX ORDER — CYANOCOBALAMIN (VITAMIN B-12) 1000 MCG
1 TABLET ORAL DAILY
Qty: 90 TABLET | Refills: 1 | Status: SHIPPED | OUTPATIENT
Start: 2020-01-10 | End: 2020-10-04 | Stop reason: SDUPTHER

## 2020-01-10 RX ORDER — DONEPEZIL HYDROCHLORIDE 10 MG/1
10 TABLET, FILM COATED ORAL EVERY EVENING
Qty: 90 TABLET | Refills: 1 | Status: SHIPPED | OUTPATIENT
Start: 2020-01-10 | End: 2020-07-14

## 2020-01-10 NOTE — PATIENT INSTRUCTIONS
"Exercises To Do While Sitting    Exercises that you do while sitting (chair exercises) can give you many of the same benefits as full exercise. Benefits include strengthening your heart, burning calories, and keeping muscles and joints healthy. Exercise can also improve your mood and help with depression and anxiety.  You may benefit from chair exercises if you are unable to do standing exercises because of:  · Diabetic foot pain.  · Obesity.  · Illness.  · Arthritis.  · Recovery from surgery or injury.  · Breathing problems.  · Balance problems.  · Another type of disability.  Before starting chair exercises, check with your health care provider or a physical therapist to find out how much exercise you can tolerate and which exercises are safe for you. If your health care provider approves:  · Start out slowly and build up over time. Aim to work up to about 10-20 minutes for each exercise session.  · Make exercise part of your daily routine.  · Drink water when you exercise. Do not wait until you are thirsty. Drink every 10-15 minutes.  · Stop exercising right away if you have pain, nausea, shortness of breath, or dizziness.  · If you are exercising in a wheelchair, make sure to lock the wheels.  · Ask your health care provider whether you can do jeni chi or yoga. Many positions in these mind-body exercises can be modified to do while seated.  Warm-up  Before starting other exercises:  1. Sit up as straight as you can. Have your knees bent at 90 degrees, which is the shape of the capital letter \"L.\" Keep your feet flat on the floor.  2. Sit at the front edge of your chair, if you can.  3. Pull in (tighten) the muscles in your abdomen and stretch your spine and neck as straight as you can. Hold this position for a few minutes.  4. Breathe in and out evenly. Try to concentrate on your breathing, and relax your mind.  Stretching  Exercise A: Arm stretch  1. Hold your arms out straight in front of your body.  2. Bend " "your hands at the wrist with your fingers pointing up, as if signaling someone to stop. Notice the slight tension in your forearms as you hold the position.  3. Keeping your arms out and your hands bent, rotate your hands outward as far as you can and hold this stretch. Aim to have your thumbs pointing up and your pinkie fingers pointing down.  Slowly repeat arm stretches for one minute as tolerated.  Exercise B: Leg stretch  1. If you can move your legs, try to \"draw\" letters on the floor with the toes of your foot. Write your name with one foot.  2. Write your name with the toes of your other foot.  Slowly repeat the movements for one minute as tolerated.  Exercise C: Reach for the lucio  1. Reach your hands as far over your head as you can to stretch your spine.  2. Move your hands and arms as if you are climbing a rope.  Slowly repeat the movements for one minute as tolerated.  Range of motion exercises  Exercise A: Shoulder roll  1. Let your arms hang loosely at your sides.  2. Lift just your shoulders up toward your ears, then let them relax back down.  3. When your shoulders feel loose, rotate your shoulders in backward and forward circles.  Do shoulder rolls slowly for one minute as tolerated.  Exercise B: March in place  1. As if you are marching, pump your arms and lift your legs up and down. Lift your knees as high as you can.  ? If you are unable to lift your knees, just pump your arms and move your ankles and feet up and down.  March in place for one minute as tolerated.  Exercise C: Seated jumping jacks  1. Let your arms hang down straight.  2. Keeping your arms straight, lift them up over your head. Aim to point your fingers to the ceiling.  3. While you lift your arms, straighten your legs and slide your heels along the floor to your sides, as wide as you can.  4. As you bring your arms back down to your sides, slide your legs back together.  ? If you are unable to use your legs, just move your " arms.  Slowly repeat seated jumping jacks for one minute as tolerated.  Strengthening exercises  Exercise A: Shoulder squeeze  1. Hold your arms straight out from your body to your sides, with your elbows bent and your fists pointed at the ceiling.  2. Keeping your arms in the bent position, move them forward so your elbows and forearms meet in front of your face.  3. Open your arms back out as wide as you can with your elbows still bent, until you feel your shoulder blades squeezing together. Hold for 5 seconds.  Slowly repeat the movements forward and backward for one minute as tolerated.  Contact a health care provider if you:  · Had to stop exercising due to any of the following:  ? Pain.  ? Nausea.  ? Shortness of breath.  ? Dizziness.  ? Fatigue.  · Have significant pain or soreness after exercising.  Get help right away if you have:  · Chest pain.  · Difficulty breathing.  These symptoms may represent a serious problem that is an emergency. Do not wait to see if the symptoms will go away. Get medical help right away. Call your local emergency services (911 in the [place] Do not drive yourself to the hospital.  This information is not intended to replace advice given to you by your health care provider. Make sure you discuss any questions you have with your health care provider.  Document Released: 10/31/2018 Document Revised: 10/31/2018 Document Reviewed: 10/31/2018  Elsevier Interactive Patient Education © 2019 [employer]

## 2020-01-10 NOTE — PROGRESS NOTES
[facility]     Jia Bright  1940   [id number]      Patient Care Team:  Estephania Wolfe MD as PCP - General (Internal Medicine)    Chief Complaint   Patient presents with   • Hyperlipidemia     f/u   • Alzheimer's Disease      The patient is usually accompanied by her daughter who his her caretaker.  A different daughter is with her today.      HPI  Patient is a 79 y.o. female presents with follow-up hyperlipidemia, dementia, and depression.      CHRONIC CONDITIONS  Patient takes medications regularly.  Her pillboxes set up by her daughter who takes care of her.    The daughter who is with her gives the history today.  She denies any problems with any medications or any behavioral problems.  Appetite is good.  She is using a walker all the time now.  She has not had any falls for a few months.  She seems less depressed and withdrawn today.    Past Medical History:   Diagnosis Date   • Chronic bilateral low back pain without sciatica 03/28/2017   • Dementia (CMS/HCC) 2011    MRI brain/carotic/EEG done   • Gastroesophageal reflux disease without esophagitis 1/4/2019   • Hyperlipidemia    • Tobacco use disorder 07/05/2011       Past Surgical History:   Procedure Laterality Date   • CHOLECYSTECTOMY  1974       Family History   Problem Relation Age of Onset   • Alzheimer's disease Mother    • Colon cancer Mother    • Skin cancer Brother    • Other Brother         amputation leg secondary to blood clot-details unknown   • Hypertension Daughter    • Diabetes Maternal Uncle    • Colon cancer Maternal Grandfather        Social History     Socioeconomic History   • Marital status:      Spouse name: Not on file   • Number of children: Not on file   • Years of education: Not on file   • Highest education level: Not on file   Tobacco Use   • Smoking status: Former Smoker     Types: Cigarettes     Last attempt to quit: 2017     Years since quitting: 3.0   • Tobacco comment: 4 cig a day   "  Substance and Sexual Activity   • Alcohol use: No     Frequency: Never   • Drug use: Defer   • Sexual activity: Defer       No Known Allergies    Review of Systems:     Review of Systems   Constitutional: Negative for chills, fatigue and fever.   Respiratory: Negative for cough, shortness of breath and wheezing.    Cardiovascular: Negative for chest pain, palpitations and leg swelling.   Gastrointestinal: Negative for abdominal pain, blood in stool, constipation and diarrhea.   Musculoskeletal: Positive for gait problem. Negative for arthralgias and back pain.   Neurological: Positive for memory problem.   Psychiatric/Behavioral: Negative for sleep disturbance, suicidal ideas and depressed mood. The patient is not nervous/anxious.        Vital Signs  Vitals:    01/10/20 1325   BP: 124/80   BP Location: Left arm   Patient Position: Sitting   Cuff Size: Adult   Pulse: 90   Weight: 74.8 kg (165 lb)   Height: 165.1 cm (65\")   PainSc: 0-No pain     Body mass index is 27.46 kg/m².      Current Outpatient Medications:   •  Calcium Carb-Cholecalciferol (OS-OSCAR CALCIUM + D3) 500-200 MG-UNIT tablet, Take 1 tablet by mouth Daily., Disp: 60 tablet, Rfl: 11  •  Cyanocobalamin (B-12) 1000 MCG tablet, Take 1 tablet by mouth Daily., Disp: 90 tablet, Rfl: 1  •  donepezil (ARICEPT) 10 MG tablet, Take 1 tablet by mouth Every Evening., Disp: 90 tablet, Rfl: 1  •  escitalopram (LEXAPRO) 20 MG tablet, Take 1 tablet by mouth Daily., Disp: 90 tablet, Rfl: 1  •  memantine (NAMENDA XR) 14 MG capsule sustained-release 24 hr extended release capsule, Take 1 capsule by mouth Daily., Disp: 90 capsule, Rfl: 1  •  MULTIPLE VITAMIN PO, Take 1 tablet by mouth Daily., Disp: , Rfl:   •  nystatin (MYCOSTATIN) 825756 UNIT/GM cream, apply by topical route 2 times every day to the affected area(s), Disp: , Rfl:   •  pravastatin (PRAVACHOL) 20 MG tablet, TAKE ONE TABLET BY MOUTH EVERY EVENING, Disp: 90 tablet, Rfl: 10    Physical Exam:    Physical Exam "   Constitutional: She is oriented to person, place, and time. She appears well-developed and well-nourished.   HENT:   Head: Normocephalic.   Eyes: Pupils are equal, round, and reactive to light. Conjunctivae and EOM are normal.   Neck: Normal range of motion. Neck supple. No thyromegaly present.   Cardiovascular: Normal rate, regular rhythm, normal heart sounds and intact distal pulses.   Pulmonary/Chest: Effort normal and breath sounds normal.   Musculoskeletal: Normal range of motion. She exhibits no edema.   Lymphadenopathy:     She has no cervical adenopathy.   Neurological: She is alert and oriented to person, place, and time. Gait abnormal.   General weakness   Psychiatric: She has a normal mood and affect. Her speech is normal and behavior is normal. Thought content normal. Cognition and memory are impaired. She exhibits abnormal recent memory.   Nursing note and vitals reviewed.       ACE III MINI        Results Review:    None    CMP:  Lab Results   Component Value Date     (H) 09/13/2019    BUN 14 09/13/2019    CREATININE 1.05 (H) 09/13/2019    EGFRIFNONA 51 (L) 09/13/2019    EGFRIFAFRI 59 (L) 09/13/2019    BCR 13 09/13/2019     09/13/2019    K 4.5 09/13/2019    CO2 20 09/13/2019    CALCIUM 10.0 09/13/2019    PROTENTOTREF 7.4 09/13/2019    ALBUMIN 4.3 09/13/2019    LABGLOBREF 3.1 09/13/2019    LABIL2 1.4 09/13/2019    BILITOT 0.5 09/13/2019    ALKPHOS 124 (H) 09/13/2019    AST 17 09/13/2019    ALT 11 09/13/2019     HbA1c:  No results found for: HGBA1C  Microalbumin:  No results found for: MICROALBUR, POCMALB, POCCREAT  Lipid Panel  Lab Results   Component Value Date    CHOL 164 01/25/2019    TRIG 172 (H) 01/25/2019    HDL 53 01/25/2019    LDL 77 01/25/2019    AST 17 09/13/2019    ALT 11 09/13/2019       Medication Review: Medications reviewed and noted  Patient Instructions   Problem List Items Addressed This Visit        Cardiovascular and Mediastinum    Mixed hyperlipidemia - Primary     Overview     1/10/2020 Estephania Wolfe MD    Continue pravastatin every evening.  Continue low-fat diet.         Relevant Medications    pravastatin (PRAVACHOL) 20 MG tablet    Other Relevant Orders    CBC & Differential    Comprehensive Metabolic Panel       Nervous and Auditory    Late onset Alzheimer's disease without behavioral disturbance (CMS/HCC)    Overview     1/10/2020 Estephania Wolfe MD    Continue donepezil every evening and memantine daily.         Relevant Medications    escitalopram (LEXAPRO) 20 MG tablet    donepezil (ARICEPT) 10 MG tablet    memantine (NAMENDA XR) 14 MG capsule sustained-release 24 hr extended release capsule       Other    Risk for falls    Overview     1/10/2020 Estephania Wolfe MD    Continue using the walker at all times.         Gait disturbance    Overview     1/10/2020 Estephania Wolfe MD    Doing some daily exercises can help prevent falls.  Do some exercises while sitting every day.         Depression, major, single episode, moderate (CMS/HCC)    Overview     1/10/2020 Estephania Wolfe MD    Continue escitalopram (Lexapro) daily.         Relevant Medications    escitalopram (LEXAPRO) 20 MG tablet    donepezil (ARICEPT) 10 MG tablet    memantine (NAMENDA XR) 14 MG capsule sustained-release 24 hr extended release capsule             Diagnosis Plan   1. Mixed hyperlipidemia  CBC & Differential    Comprehensive Metabolic Panel   2. Late onset Alzheimer's disease without behavioral disturbance (CMS/HCC)     3. Depression, major, single episode, moderate (CMS/HCC)     4. Risk for falls     5. Gait disturbance         Patient Instructions   Exercises To Do While Sitting    Exercises that you do while sitting (chair exercises) can give you many of the same benefits as full exercise. Benefits include strengthening your heart, burning calories, and keeping muscles and joints healthy. Exercise can also improve your mood and help with depression and anxiety.  You may benefit  "from chair exercises if you are unable to do standing exercises because of:  · Diabetic foot pain.  · Obesity.  · Illness.  · Arthritis.  · Recovery from surgery or injury.  · Breathing problems.  · Balance problems.  · Another type of disability.  Before starting chair exercises, check with your health care provider or a physical therapist to find out how much exercise you can tolerate and which exercises are safe for you. If your health care provider approves:  · Start out slowly and build up over time. Aim to work up to about 10-20 minutes for each exercise session.  · Make exercise part of your daily routine.  · Drink water when you exercise. Do not wait until you are thirsty. Drink every 10-15 minutes.  · Stop exercising right away if you have pain, nausea, shortness of breath, or dizziness.  · If you are exercising in a wheelchair, make sure to lock the wheels.  · Ask your health care provider whether you can do jeni chi or yoga. Many positions in these mind-body exercises can be modified to do while seated.  Warm-up  Before starting other exercises:  1. Sit up as straight as you can. Have your knees bent at 90 degrees, which is the shape of the capital letter \"L.\" Keep your feet flat on the floor.  2. Sit at the front edge of your chair, if you can.  3. Pull in (tighten) the muscles in your abdomen and stretch your spine and neck as straight as you can. Hold this position for a few minutes.  4. Breathe in and out evenly. Try to concentrate on your breathing, and relax your mind.  Stretching  Exercise A: Arm stretch  1. Hold your arms out straight in front of your body.  2. Bend your hands at the wrist with your fingers pointing up, as if signaling someone to stop. Notice the slight tension in your forearms as you hold the position.  3. Keeping your arms out and your hands bent, rotate your hands outward as far as you can and hold this stretch. Aim to have your thumbs pointing up and your pinkie fingers " "pointing down.  Slowly repeat arm stretches for one minute as tolerated.  Exercise B: Leg stretch  1. If you can move your legs, try to \"draw\" letters on the floor with the toes of your foot. Write your name with one foot.  2. Write your name with the toes of your other foot.  Slowly repeat the movements for one minute as tolerated.  Exercise C: Reach for the lucio  1. Reach your hands as far over your head as you can to stretch your spine.  2. Move your hands and arms as if you are climbing a rope.  Slowly repeat the movements for one minute as tolerated.  Range of motion exercises  Exercise A: Shoulder roll  1. Let your arms hang loosely at your sides.  2. Lift just your shoulders up toward your ears, then let them relax back down.  3. When your shoulders feel loose, rotate your shoulders in backward and forward circles.  Do shoulder rolls slowly for one minute as tolerated.  Exercise B: March in place  1. As if you are marching, pump your arms and lift your legs up and down. Lift your knees as high as you can.  ? If you are unable to lift your knees, just pump your arms and move your ankles and feet up and down.  March in place for one minute as tolerated.  Exercise C: Seated jumping jacks  1. Let your arms hang down straight.  2. Keeping your arms straight, lift them up over your head. Aim to point your fingers to the ceiling.  3. While you lift your arms, straighten your legs and slide your heels along the floor to your sides, as wide as you can.  4. As you bring your arms back down to your sides, slide your legs back together.  ? If you are unable to use your legs, just move your arms.  Slowly repeat seated jumping jacks for one minute as tolerated.  Strengthening exercises  Exercise A: Shoulder squeeze  1. Hold your arms straight out from your body to your sides, with your elbows bent and your fists pointed at the ceiling.  2. Keeping your arms in the bent position, move them forward so your elbows and forearms " meet in front of your face.  3. Open your arms back out as wide as you can with your elbows still bent, until you feel your shoulder blades squeezing together. Hold for 5 seconds.  Slowly repeat the movements forward and backward for one minute as tolerated.  Contact a health care provider if you:  · Had to stop exercising due to any of the following:  ? Pain.  ? Nausea.  ? Shortness of breath.  ? Dizziness.  ? Fatigue.  · Have significant pain or soreness after exercising.  Get help right away if you have:  · Chest pain.  · Difficulty breathing.  These symptoms may represent a serious problem that is an emergency. Do not wait to see if the symptoms will go away. Get medical help right away. Call your local emergency services (911 in the [place] Do not drive yourself to the hospital.  This information is not intended to replace advice given to you by your health care provider. Make sure you discuss any questions you have with your health care provider.  Document Released: 10/31/2018 Document Revised: 10/31/2018 Document Reviewed: 10/31/2018  [employer] Interactive Patient Education © 2019 [employer]        Plan of care reviewed with patient at the conclusion of today's visit. Education was provided regarding diagnosis, management, and any prescribed or recommended OTC medications.Patient verbalizes understanding of and agreement with management plan.         Estephania Wolfe MD

## 2020-04-16 DIAGNOSIS — F32.1 DEPRESSION, MAJOR, SINGLE EPISODE, MODERATE (HCC): Chronic | ICD-10-CM

## 2020-04-16 RX ORDER — ESCITALOPRAM OXALATE 20 MG/1
TABLET ORAL
Qty: 90 TABLET | Refills: 1 | Status: SHIPPED | OUTPATIENT
Start: 2020-04-16 | End: 2020-09-28 | Stop reason: SDUPTHER

## 2020-07-14 RX ORDER — DONEPEZIL HYDROCHLORIDE 10 MG/1
TABLET, FILM COATED ORAL
Qty: 30 TABLET | Refills: 0 | Status: SHIPPED | OUTPATIENT
Start: 2020-07-14 | End: 2020-09-28 | Stop reason: SDUPTHER

## 2020-09-09 ENCOUNTER — TELEPHONE (OUTPATIENT)
Dept: INTERNAL MEDICINE | Facility: CLINIC | Age: 80
End: 2020-09-09

## 2020-09-09 NOTE — TELEPHONE ENCOUNTER
Labs ordered 01/10/20. Never completed. Lab reminder mailed 07/28/20. Patient no showed appt 05/11/20. No future appt. Can we cancel lab order?

## 2020-09-24 RX ORDER — MEMANTINE HYDROCHLORIDE 14 MG/1
CAPSULE, EXTENDED RELEASE ORAL
Qty: 90 CAPSULE | Refills: 1 | Status: SHIPPED | OUTPATIENT
Start: 2020-09-24 | End: 2021-03-01

## 2020-09-24 NOTE — TELEPHONE ENCOUNTER
Please call daughter and let her know I sent in refills for her mother.  She needs to schedule an annual wellness visit with me for this month or next month

## 2020-09-25 NOTE — TELEPHONE ENCOUNTER
LVM for patients daughter. Unable to reach. Made patient an appointment and mailed appointment reminder letter out.

## 2020-09-28 ENCOUNTER — OFFICE VISIT (OUTPATIENT)
Dept: INTERNAL MEDICINE | Facility: CLINIC | Age: 80
End: 2020-09-28

## 2020-09-28 ENCOUNTER — LAB REQUISITION (OUTPATIENT)
Dept: LAB | Facility: HOSPITAL | Age: 80
End: 2020-09-28

## 2020-09-28 VITALS
SYSTOLIC BLOOD PRESSURE: 114 MMHG | DIASTOLIC BLOOD PRESSURE: 80 MMHG | BODY MASS INDEX: 24.56 KG/M2 | HEIGHT: 65 IN | WEIGHT: 147.4 LBS | HEART RATE: 64 BPM | TEMPERATURE: 97.3 F

## 2020-09-28 DIAGNOSIS — F02.80 LATE ONSET ALZHEIMER'S DISEASE WITHOUT BEHAVIORAL DISTURBANCE (HCC): ICD-10-CM

## 2020-09-28 DIAGNOSIS — F32.1 DEPRESSION, MAJOR, SINGLE EPISODE, MODERATE (HCC): Chronic | ICD-10-CM

## 2020-09-28 DIAGNOSIS — R44.3 HALLUCINATIONS: ICD-10-CM

## 2020-09-28 DIAGNOSIS — R26.9 GAIT DISTURBANCE: ICD-10-CM

## 2020-09-28 DIAGNOSIS — E78.2 MIXED HYPERLIPIDEMIA: ICD-10-CM

## 2020-09-28 DIAGNOSIS — Z00.00 ROUTINE GENERAL MEDICAL EXAMINATION AT A HEALTH CARE FACILITY: ICD-10-CM

## 2020-09-28 DIAGNOSIS — G30.1 LATE ONSET ALZHEIMER'S DISEASE WITHOUT BEHAVIORAL DISTURBANCE (HCC): ICD-10-CM

## 2020-09-28 DIAGNOSIS — R29.6 FREQUENT FALLS: Primary | ICD-10-CM

## 2020-09-28 DIAGNOSIS — Z91.81 RISK FOR FALLS: ICD-10-CM

## 2020-09-28 DIAGNOSIS — R82.90 ABNORMAL URINE ODOR: ICD-10-CM

## 2020-09-28 DIAGNOSIS — F32.1 DEPRESSION, MAJOR, SINGLE EPISODE, MODERATE (HCC): ICD-10-CM

## 2020-09-28 PROCEDURE — G0008 ADMIN INFLUENZA VIRUS VAC: HCPCS | Performed by: INTERNAL MEDICINE

## 2020-09-28 PROCEDURE — 36415 COLL VENOUS BLD VENIPUNCTURE: CPT

## 2020-09-28 PROCEDURE — 99214 OFFICE O/P EST MOD 30 MIN: CPT | Performed by: INTERNAL MEDICINE

## 2020-09-28 PROCEDURE — 90694 VACC AIIV4 NO PRSRV 0.5ML IM: CPT | Performed by: INTERNAL MEDICINE

## 2020-09-28 RX ORDER — QUETIAPINE FUMARATE 25 MG/1
25 TABLET, FILM COATED ORAL NIGHTLY
Qty: 30 TABLET | Refills: 5 | Status: SHIPPED | OUTPATIENT
Start: 2020-09-28 | End: 2020-10-20

## 2020-09-28 RX ORDER — DONEPEZIL HYDROCHLORIDE 10 MG/1
10 TABLET, FILM COATED ORAL EVERY EVENING
Qty: 90 TABLET | Refills: 1 | Status: SHIPPED | OUTPATIENT
Start: 2020-09-28 | End: 2020-09-28 | Stop reason: SDUPTHER

## 2020-09-28 RX ORDER — ESCITALOPRAM OXALATE 20 MG/1
20 TABLET ORAL DAILY
Qty: 90 TABLET | Refills: 1 | Status: SHIPPED | OUTPATIENT
Start: 2020-09-28 | End: 2021-06-02 | Stop reason: SDUPTHER

## 2020-09-28 RX ORDER — DONEPEZIL HYDROCHLORIDE 5 MG/1
5 TABLET, FILM COATED ORAL EVERY EVENING
Qty: 90 TABLET | Refills: 1 | Status: SHIPPED | OUTPATIENT
Start: 2020-09-28 | End: 2021-03-01

## 2020-09-28 NOTE — PROGRESS NOTES
[facility]     Jia Bright  1940   [id number]      Patient Care Team:  Estephania Wolef MD as PCP - General (Internal Medicine)    Chief Complaint   Patient presents with   • Urinary Tract Infection     odor ,frequency , urgency     • Flu Vaccine            HPI  Patient is a 79 y.o. female presents with strong odor to urine. Onset of symptoms was gradual starting a few weeks ago.  Chronicity acute. Severity moderate.  Symptoms are associated with frequency and urgency and sometimes leakage before getting to the bathroom. Pertinent negatives no dysuria or fever or chills.   Symptoms are aggravated by dementia and forgetting to go to the bathroom more often.   Symptoms improve with nothing.  Context unsure how much fluid she is actually drinking since she is at home during the day by herself.        HPI  Trouble sleeping. Sleeps a lot during day.  Hallucinations during the night.    HPI  Falls in kitchen and she goes outside sometimes to get dog and has fallen then. No dizziness.  No ear pain.  We have discussed using a walker in the past, but she does not understand when someone tries to show her how to use it.      CHRONIC CONDITIONS  Dementia gradually getting worse. Appetite good. Hallucinations at nite that worry her and she gets up and goes to daughter's room to tell her.  The family has to help her get dressed.  The daughter she lives with does leave things out for her for lunch but it is unclear whether she remembers to eat them or not.    Patient and daughter deny depression symptoms.  She is taking escitalopram daily.    Past Medical History:   Diagnosis Date   • Chronic bilateral low back pain without sciatica 03/28/2017   • Dementia (CMS/HCC) 2011    MRI brain/carotic/EEG done   • Gastroesophageal reflux disease without esophagitis 1/4/2019   • Hyperlipidemia    • Tobacco use disorder 07/05/2011       Past Surgical History:   Procedure Laterality Date   • CHOLECYSTECTOMY  1974        Family History   Problem Relation Age of Onset   • Alzheimer's disease Mother    • Colon cancer Mother    • Skin cancer Brother    • Other Brother         amputation leg secondary to blood clot-details unknown   • Hypertension Daughter    • Diabetes Maternal Uncle    • Colon cancer Maternal Grandfather        Social History     Socioeconomic History   • Marital status:      Spouse name: Not on file   • Number of children: Not on file   • Years of education: Not on file   • Highest education level: Not on file   Tobacco Use   • Smoking status: Former Smoker     Packs/day: 1.50     Years: 56.00     Pack years: 84.00     Types: Cigarettes     Quit date: 2017     Years since quitting: 3.7   • Smokeless tobacco: Never Used   • Tobacco comment: 4 cig a day    Substance and Sexual Activity   • Alcohol use: No     Frequency: Never   • Drug use: Defer   • Sexual activity: Defer       No Known Allergies    Review of Systems:     Review of Systems   Constitutional: Negative for chills, fatigue and fever.   HENT: Negative for congestion, ear pain and sinus pressure.    Respiratory: Negative for cough, chest tightness, shortness of breath and wheezing.    Cardiovascular: Negative for chest pain and palpitations.   Gastrointestinal: Negative for abdominal pain, blood in stool and constipation.   Genitourinary: Positive for frequency, urgency and urinary incontinence. Negative for dysuria, pelvic pain and pelvic pressure.   Musculoskeletal: Negative for arthralgias and back pain.   Skin: Negative for color change.   Allergic/Immunologic: Negative for environmental allergies.   Neurological: Positive for memory problem. Negative for dizziness, speech difficulty and headache.   Psychiatric/Behavioral: Positive for hallucinations and sleep disturbance. Negative for decreased concentration, suicidal ideas and depressed mood. The patient is not nervous/anxious.        Vital Signs  Vitals:    09/28/20 1337   BP: 114/80  "  BP Location: Left arm   Patient Position: Sitting   Cuff Size: Adult   Pulse: 64   Temp: 97.3 °F (36.3 °C)   TempSrc: Temporal   Weight: 66.9 kg (147 lb 6.4 oz)   Height: 165.1 cm (65\")   PainSc: 0-No pain     Body mass index is 24.53 kg/m².      Current Outpatient Medications:   •  Calcium Carb-Cholecalciferol (OS-OSCAR CALCIUM + D3) 500-200 MG-UNIT tablet, Take 1 tablet by mouth Daily., Disp: 60 tablet, Rfl: 11  •  Cyanocobalamin (B-12) 1000 MCG tablet, Take 1 tablet by mouth Daily., Disp: 90 tablet, Rfl: 1  •  donepezil (ARICEPT) 5 MG tablet, Take 1 tablet by mouth Every Evening., Disp: 90 tablet, Rfl: 1  •  escitalopram (LEXAPRO) 20 MG tablet, Take 1 tablet by mouth Daily., Disp: 90 tablet, Rfl: 1  •  memantine (NAMENDA XR) 14 MG capsule sustained-release 24 hr extended release capsule, TAKE 1 CAPSULE BY MOUTH EVERY DAY, Disp: 90 capsule, Rfl: 1  •  MULTIPLE VITAMIN PO, Take 1 tablet by mouth Daily., Disp: , Rfl:   •  nystatin (MYCOSTATIN) 680303 UNIT/GM cream, apply by topical route 2 times every day to the affected area(s), Disp: , Rfl:   •  pravastatin (PRAVACHOL) 20 MG tablet, TAKE ONE TABLET BY MOUTH EVERY EVENING, Disp: 90 tablet, Rfl: 10  •  QUEtiapine (SEROquel) 25 MG tablet, Take 1 tablet by mouth Every Night., Disp: 30 tablet, Rfl: 5    Physical Exam:    Physical Exam  Vitals signs and nursing note reviewed.   Constitutional:       Appearance: She is well-developed.   HENT:      Head: Normocephalic.   Eyes:      Conjunctiva/sclera: Conjunctivae normal.      Pupils: Pupils are equal, round, and reactive to light.   Neck:      Musculoskeletal: Normal range of motion and neck supple.      Thyroid: No thyromegaly.   Cardiovascular:      Rate and Rhythm: Normal rate and regular rhythm.      Heart sounds: Normal heart sounds.   Pulmonary:      Effort: Pulmonary effort is normal.      Breath sounds: Normal breath sounds. No wheezing.   Musculoskeletal: Normal range of motion.   Lymphadenopathy:      Cervical: " No cervical adenopathy.   Skin:     General: Skin is warm and dry.   Neurological:      Mental Status: She is alert.      Cranial Nerves: Cranial nerves are intact.      Sensory: Sensation is intact.      Motor: Motor function is intact.      Gait: Gait abnormal.      Comments: Oriented to place and person   Psychiatric:         Mood and Affect: Affect normal.         Speech: Speech normal.         Behavior: Behavior normal.         Thought Content: Thought content is not paranoid.         Cognition and Memory: Cognition is impaired. Memory is impaired.         Judgment: Judgment is impulsive.          ACE III MINI        Results Review:    None    CMP:  Lab Results   Component Value Date     (H) 09/13/2019    BUN 14 09/13/2019    CREATININE 1.05 (H) 09/13/2019    EGFRIFNONA 51 (L) 09/13/2019    EGFRIFAFRI 59 (L) 09/13/2019    BCR 13 09/13/2019     09/13/2019    K 4.5 09/13/2019    CO2 20 09/13/2019    CALCIUM 10.0 09/13/2019    PROTENTOTREF 7.4 09/13/2019    ALBUMIN 4.3 09/13/2019    LABGLOBREF 3.1 09/13/2019    LABIL2 1.4 09/13/2019    BILITOT 0.5 09/13/2019    ALKPHOS 124 (H) 09/13/2019    AST 17 09/13/2019    ALT 11 09/13/2019     HbA1c:  No results found for: HGBA1C  Microalbumin:  No results found for: MICROALBUR, POCMALB, POCCREAT  Lipid Panel  Lab Results   Component Value Date    CHOL 164 01/25/2019    TRIG 172 (H) 01/25/2019    HDL 53 01/25/2019    LDL 77 01/25/2019    AST 17 09/13/2019    ALT 11 09/13/2019       Medication Review: Medications reviewed and noted  Patient Instructions   Problem List Items Addressed This Visit        Cardiovascular and Mediastinum    Mixed hyperlipidemia    Overview     9/28/2020 Estephania Wolfe MD    Continue pravastatin every evening.  Continue low-fat diet.         Relevant Medications    pravastatin (PRAVACHOL) 20 MG tablet       Nervous and Auditory    Late onset Alzheimer's disease without behavioral disturbance (CMS/HCC)    Overview     9/28/2020 Estephania  MIRANDA Wolfe MD    Add quetiapine every evening to decrease hallucinations and help her sleep through the night so she is not getting up wandering around the house and increasing fall risk.     Decrease donepezil to a 5 mg tablet (or half of a 10 mg tablet) every evening to see if loose stools improved.  Continue memantine daily.    Refer to  through case management to see if she can help the family get Medicaid in place and explore long-term facilities to take care of her.  It is an unsafe situation with her being at home all day by herself that all of the family members work.         Relevant Medications    memantine (NAMENDA XR) 14 MG capsule sustained-release 24 hr extended release capsule    escitalopram (LEXAPRO) 20 MG tablet    QUEtiapine (SEROquel) 25 MG tablet    donepezil (ARICEPT) 5 MG tablet    Other Relevant Orders    CBC & Differential    Comprehensive Metabolic Panel    TSH    Ambulatory Referral to Case Management [facility] - High Risk Care Management       Other    Risk for falls    Overview     9/28/2020 Estephania Wolfe MD    She is at high risk for falls.  Discussed fall prevention with daughter.         Relevant Orders    Ambulatory Referral to Case Management [facility] - High Risk Care Management    Gait disturbance    Overview     9/28/2020 Estephania Wolfe MD    Doing some daily exercises can help prevent falls.  The family could help her do some exercises while sitting every day.         Relevant Orders    Ambulatory Referral to Case Management [facility] - High Risk Care Management    Depression, major, single episode, moderate (CMS/HCC)    Overview     9/28/2020 Estephania Wolfe MD    Continue escitalopram (Lexapro) daily.         Relevant Medications    memantine (NAMENDA XR) 14 MG capsule sustained-release 24 hr extended release capsule    escitalopram (LEXAPRO) 20 MG tablet    QUEtiapine (SEROquel) 25 MG tablet    donepezil (ARICEPT) 5 MG tablet    Other Relevant Orders    Vitamin B12     Vitamin D 25 Hydroxy    Frequent falls - Primary    Overview     9/28/2020 Estephania Wolfe MD    Education provided.         Hallucinations    Overview     9/28/2020 Estephania Wolfe MD    Add quetiapine every evening.         Abnormal urine odor    Overview     9/28/2020 Estephania Wolfe MD    Urinalysis ordered. Increase water intake.                  Diagnosis Plan   1. Frequent falls     2. Late onset Alzheimer's disease without behavioral disturbance (CMS/HCC)  CBC & Differential    Comprehensive Metabolic Panel    TSH    Ambulatory Referral to Case Management [facility] - High Risk Care Management    TSH    Comprehensive Metabolic Panel    CBC & Differential   3. Hallucinations     4. Abnormal urine odor     5. Depression, major, single episode, moderate (CMS/HCC)  Vitamin B12    Vitamin D 25 Hydroxy    escitalopram (LEXAPRO) 20 MG tablet    Vitamin D 25 Hydroxy    Vitamin B12   6. Risk for falls  Ambulatory Referral to Case Management [facility] - High Risk Care Management   7. Gait disturbance  Ambulatory Referral to Case Management [facility] - High Risk Care Management   8. Depression, major, single episode, moderate (CMS/HCC)  Vitamin B12    Vitamin D 25 Hydroxy    escitalopram (LEXAPRO) 20 MG tablet    Vitamin D 25 Hydroxy    Vitamin B12    -   9. Mixed hyperlipidemia         Plan of care reviewed with patient at the conclusion of today's visit. Education was provided regarding diagnosis, management, and any prescribed or recommended OTC medications.Patient verbalizes understanding of and agreement with management plan.         Estephania Wolfe MD

## 2020-09-28 NOTE — PATIENT INSTRUCTIONS
Patient Instructions   Problem List Items Addressed This Visit        Cardiovascular and Mediastinum    Mixed hyperlipidemia    Overview     9/28/2020 Estephania Wolfe MD    Continue pravastatin every evening.  Continue low-fat diet.         Relevant Medications    pravastatin (PRAVACHOL) 20 MG tablet       Nervous and Auditory    Late onset Alzheimer's disease without behavioral disturbance (CMS/HCC)    Overview     9/28/2020 Estephania Wolfe MD    Add quetiapine every evening to decrease hallucinations and help her sleep through the night so she is not getting up wandering around the house and increasing fall risk.     Decrease donepezil to a 5 mg tablet (or half of a 10 mg tablet) every evening to see if loose stools improved.  Continue memantine daily.    Refer to  through case management to see if she can help the family get Medicaid in place and explore long-term facilities to take care of her.  It is an unsafe situation with her being at home all day by herself that all of the family members work.         Relevant Medications    memantine (NAMENDA XR) 14 MG capsule sustained-release 24 hr extended release capsule    escitalopram (LEXAPRO) 20 MG tablet    QUEtiapine (SEROquel) 25 MG tablet    donepezil (ARICEPT) 5 MG tablet    Other Relevant Orders    CBC & Differential    Comprehensive Metabolic Panel    TSH    Ambulatory Referral to Case Management [facility] High Risk Care Management       Other    Risk for falls    Overview     9/28/2020 Estephania Wolfe MD    She is at high risk for falls.  Discussed fall prevention with daughter.         Relevant Orders    Ambulatory Referral to Case Management [facility] High Risk Care Management    Gait disturbance    Overview     9/28/2020 Estephania Wolfe MD    Doing some daily exercises can help prevent falls.  The family could help her do some exercises while sitting every day.         Relevant Orders    Ambulatory Referral to Case Management [facility]  Risk Care Management    Depression, major, single episode, moderate (CMS/HCC)    Overview     9/28/2020 Estephania Wolfe MD    Continue escitalopram (Lexapro) daily.         Relevant Medications    memantine (NAMENDA XR) 14 MG capsule sustained-release 24 hr extended release capsule    escitalopram (LEXAPRO) 20 MG tablet    QUEtiapine (SEROquel) 25 MG tablet    donepezil (ARICEPT) 5 MG tablet    Other Relevant Orders    Vitamin B12    Vitamin D 25 Hydroxy    Frequent falls - Primary    Overview     9/28/2020 Estephania Wolfe MD    Education provided.         Hallucinations    Overview     9/28/2020 Estephania Wolfe MD    Add quetiapine every evening.         Abnormal urine odor    Overview     9/28/2020 Estephania Wolfe MD    Urinalysis ordered. Increase water intake.                  Dementia Caregiver Guide  Dementia is a term used to describe a number of symptoms that affect memory and thinking. The most common symptoms include:  · Memory loss.  · Trouble with language and communication.  · Trouble concentrating.  · Poor judgment.  · Problems with reasoning.  · Child-like behavior and language.  · Extreme anxiety.  · Angry outbursts.  · Wandering from home or public places.  Dementia usually gets worse slowly over time. In the early stages, people with dementia can stay independent and safe with some help. In later stages, they need help with daily tasks such as dressing, grooming, and using the bathroom.  How to help the person with dementia cope  Dementia can be frightening and confusing. Here are some tips to help the person with dementia cope with changes caused by the disease.  General tips  · Keep the person on track with his or her routine.  · Try to identify areas where the person may need help.  · Be supportive, patient, calm, and encouraging.  · Gently remind the person that adjusting to changes takes time.  · Help with the tasks that the person has asked for help with.  · Keep the person involved  in daily tasks and decisions as much as possible.  · Encourage conversation, but try not to get frustrated or harried if the person struggles to find words or does not seem to appreciate your help.  Communication tips  · When the person is talking or seems frustrated, make eye contact and hold the person's hand.  · Ask specific questions that need yes or no answers.  · Use simple words, short sentences, and a calm voice. Only give one direction at a time.  · When offering choices, limit them to just 1 or 2.  · Avoid correcting the person in a negative way.  · If the person is struggling to find the right words, gently try to help him or her.  How to recognize symptoms of stress  Symptoms of stress in caregivers include:  · Feeling frustrated or angry with the person with dementia.  · Denying that the person has dementia or that his or her symptoms will not improve.  · Feeling hopeless and unappreciated.  · Difficulty sleeping.  · Difficulty concentrating.  · Feeling anxious, irritable, or depressed.  · Developing stress-related health problems.  · Feeling like you have too little time for your own life.  Follow these instructions at home:    · Make sure that you and the person you are caring for:  ? Get regular sleep.  ? Exercise regularly.  ? Eat regular, nutritious meals.  ? Drink enough fluid to keep your urine clear or pale yellow.  ? Take over-the-counter and prescription medicines only as told by your health care providers.  ? Attend all scheduled health care appointments.  · Join a support group with others who are caregivers.  · Ask about respite care resources so that you can have a regular break from the stress of caregiving.  · Look for signs of stress in yourself and in the person you are caring for. If you notice signs of stress, take steps to manage it.  · Consider any safety risks and take steps to avoid them.  · Organize medications in a pill box for each day of the week.  · Create a plan to handle  any legal or financial matters. Get legal or financial advice if needed.  · Keep a calendar in a central location to remind the person of appointments or other activities.  Tips for reducing the risk of injury  · Keep floors clear of clutter. Remove rugs, magazine racks, and floor lamps.  · Keep hallways well lit, especially at night.  · Put a handrail and nonslip mat in the bathtub or shower.  · Put childproof locks on cabinets that contain dangerous items, such as medicines, alcohol, guns, toxic cleaning items, sharp tools or utensils, matches, and lighters.  · Put the locks in places where the person cannot see or reach them easily. This will help ensure that the person does not wander out of the house and get lost.  · Be prepared for emergencies. Keep a list of emergency phone numbers and addresses in a convenient area.  · Remove car keys and lock garage doors so that the person does not try to get in the car and drive.  · Have the person wear a bracelet that tracks locations and identifies the person as having memory problems. This should be worn at all times for safety.  Where to find support:  Many individuals and organizations offer support. These include:  · Support groups for people with dementia and for caregivers.  · Counselors or therapists.  · Home health care services.  · Adult day care centers.  Where to find more information  Alzheimer's Association: www.alz.org  Contact a health care provider if:  · The person's health is rapidly getting worse.  · You are no longer able to care for the person.  · Caring for the person is affecting your physical and emotional health.  · The person threatens himself or herself, you, or anyone else.  Summary  · Dementia is a term used to describe a number of symptoms that affect memory and thinking.  · Dementia usually gets worse slowly over time.  · Take steps to reduce the person's risk of injury, and to plan for future care.  · Caregivers need support, relief from  caregiving, and time for their own lives.  This information is not intended to replace advice given to you by your health care provider. Make sure you discuss any questions you have with your health care provider.  Document Released: 11/21/2017 Document Revised: 11/30/2018 Document Reviewed: 11/21/2017  [employer] Patient Education © 2020 [employer]    Fall Prevention in the Home, Adult  Falls can cause injuries and can affect people from all age groups. There are many simple things that you can do to make your home safe and to help prevent falls. Ask for help when making these changes, if needed.  What actions can I take to prevent falls?  General instructions  · Use good lighting in all rooms. Replace any light bulbs that burn out.  · Turn on lights if it is dark. Use night-lights.  · Place frequently used items in easy-to-reach places. Lower the shelves around your home if necessary.  · Set up furniture so that there are clear paths around it. Avoid moving your furniture around.  · Remove throw rugs and other tripping hazards from the floor.  · Avoid walking on wet floors.  · Fix any uneven floor surfaces.  · Add color or contrast paint or tape to grab bars and handrails in your home. Place contrasting color strips on the first and last steps of stairways.  · When you use a stepladder, make sure that it is completely opened and that the sides are firmly locked. Have someone hold the ladder while you are using it. Do not climb a closed stepladder.  · Be aware of any and all pets.  What can I do in the bathroom?         · Keep the floor dry. Immediately clean up any water that spills onto the floor.  · Remove soap buildup in the tub or shower on a regular basis.  · Use non-skid mats or decals on the floor of the tub or shower.  · Attach bath mats securely with double-sided, non-slip rug tape.  · If you need to sit down while you are in the shower, use a plastic, non-slip stool.  · Install grab bars by the toilet  and in the tub and shower. Do not use towel bars as grab bars.  What can I do in the bedroom?  · Make sure that a bedside light is easy to reach.  · Do not use oversized bedding that drapes onto the floor.  · Have a firm chair that has side arms to use for getting dressed.  What can I do in the kitchen?  · Clean up any spills right away.  · If you need to reach for something above you, use a sturdy step stool that has a grab bar.  · Keep electrical cables out of the way.  · Do not use floor polish or wax that makes floors slippery. If you must use wax, make sure that it is non-skid floor wax.  What can I do in the stairways?  · Do not leave any items on the stairs.  · Make sure that you have a light switch at the top of the stairs and the bottom of the stairs. Have them installed if you do not have them.  · Make sure that there are handrails on both sides of the stairs. Fix handrails that are broken or loose. Make sure that handrails are as long as the stairways.  · Install non-slip stair treads on all stairs in your home.  · Avoid having throw rugs at the top or bottom of stairways, or secure the rugs with carpet tape to prevent them from moving.  · Choose a carpet design that does not hide the edge of steps on the stairway.  · Check any carpeting to make sure that it is firmly attached to the stairs. Fix any carpet that is loose or worn.  What can I do on the outside of my home?  · Use bright outdoor lighting.  · Regularly repair the edges of walkways and driveways and fix any cracks.  · Remove high doorway thresholds.  · Trim any shrubbery on the main path into your home.  · Regularly check that handrails are securely fastened and in good repair. Both sides of any steps should have handrails.  · Install guardrails along the edges of any raised decks or porches.  · Clear walkways of debris and clutter, including tools and rocks.  · Have leaves, snow, and ice cleared regularly.  · Use sand or salt on walkways  during winter months.  · In the garage, clean up any spills right away, including grease or oil spills.  What other actions can I take?  · Wear closed-toe shoes that fit well and support your feet. Wear shoes that have rubber soles or low heels.  · Use mobility aids as needed, such as canes, walkers, scooters, and crutches.  · Review your medicines with your health care provider. Some medicines can cause dizziness or changes in blood pressure, which increase your risk of falling.  Talk with your health care provider about other ways that you can decrease your risk of falls. This may include working with a physical therapist or  to improve your strength, balance, and endurance.  Where to find more information  · Centers for Disease Control and Prevention, [employer]: https://www.cdc.gov  · National [place] on Aging: https://zd4qaww.romy.nih.gov  Contact a health care provider if:  · You are afraid of falling at home.  · You feel weak, drowsy, or dizzy at home.  · You fall at home.  Summary  · There are many simple things that you can do to make your home safe and to help prevent falls.  · Ways to make your home safe include removing tripping hazards and installing grab bars in the bathroom.  · Ask for help when making these changes in your home.  This information is not intended to replace advice given to you by your health care provider. Make sure you discuss any questions you have with your health care provider.  Document Released: 12/08/2003 Document Revised: 11/30/2018 Document Reviewed: 08/02/2018  [employer]vier Patient Education © 2020 [employer]

## 2020-09-29 LAB
25(OH)D3+25(OH)D2 SERPL-MCNC: 4.6 NG/ML (ref 30–100)
ALBUMIN SERPL-MCNC: 4 G/DL (ref 3.7–4.7)
ALBUMIN/GLOB SERPL: 1.3 {RATIO} (ref 1.2–2.2)
ALP SERPL-CCNC: 127 IU/L (ref 39–117)
ALT SERPL-CCNC: 13 IU/L (ref 0–32)
AST SERPL-CCNC: 24 IU/L (ref 0–40)
BASOPHILS # BLD AUTO: 0.1 X10E3/UL (ref 0–0.2)
BASOPHILS NFR BLD AUTO: 1 %
BILIRUB SERPL-MCNC: 0.4 MG/DL (ref 0–1.2)
BUN SERPL-MCNC: 14 MG/DL (ref 8–27)
BUN/CREAT SERPL: 15 (ref 12–28)
CALCIUM SERPL-MCNC: 9.5 MG/DL (ref 8.7–10.3)
CHLORIDE SERPL-SCNC: 104 MMOL/L (ref 96–106)
CO2 SERPL-SCNC: 21 MMOL/L (ref 20–29)
CREAT SERPL-MCNC: 0.96 MG/DL (ref 0.57–1)
EOSINOPHIL # BLD AUTO: 0.2 X10E3/UL (ref 0–0.4)
EOSINOPHIL NFR BLD AUTO: 2 %
ERYTHROCYTE [DISTWIDTH] IN BLOOD BY AUTOMATED COUNT: 12.9 % (ref 11.7–15.4)
GLOBULIN SER CALC-MCNC: 3.2 G/DL (ref 1.5–4.5)
GLUCOSE SERPL-MCNC: 93 MG/DL (ref 65–99)
HCT VFR BLD AUTO: 44.9 % (ref 34–46.6)
HGB BLD-MCNC: 14.6 G/DL (ref 11.1–15.9)
IMM GRANULOCYTES # BLD AUTO: 0 X10E3/UL (ref 0–0.1)
IMM GRANULOCYTES NFR BLD AUTO: 0 %
LYMPHOCYTES # BLD AUTO: 1.4 X10E3/UL (ref 0.7–3.1)
LYMPHOCYTES NFR BLD AUTO: 16 %
MCH RBC QN AUTO: 30.2 PG (ref 26.6–33)
MCHC RBC AUTO-ENTMCNC: 32.5 G/DL (ref 31.5–35.7)
MCV RBC AUTO: 93 FL (ref 79–97)
MONOCYTES # BLD AUTO: 0.8 X10E3/UL (ref 0.1–0.9)
MONOCYTES NFR BLD AUTO: 9 %
NEUTROPHILS # BLD AUTO: 6.6 X10E3/UL (ref 1.4–7)
NEUTROPHILS NFR BLD AUTO: 72 %
PLATELET # BLD AUTO: 367 X10E3/UL (ref 150–450)
POTASSIUM SERPL-SCNC: 4.2 MMOL/L (ref 3.5–5.2)
PROT SERPL-MCNC: 7.2 G/DL (ref 6–8.5)
RBC # BLD AUTO: 4.84 X10E6/UL (ref 3.77–5.28)
SODIUM SERPL-SCNC: 144 MMOL/L (ref 134–144)
TSH SERPL DL<=0.005 MIU/L-ACNC: 3.85 UIU/ML (ref 0.45–4.5)
VIT B12 SERPL-MCNC: 382 PG/ML (ref 232–1245)
WBC # BLD AUTO: 9.1 X10E3/UL (ref 3.4–10.8)

## 2020-10-01 ENCOUNTER — EPISODE CHANGES (OUTPATIENT)
Dept: CASE MANAGEMENT | Facility: OTHER | Age: 80
End: 2020-10-01

## 2020-10-04 RX ORDER — CYANOCOBALAMIN (VITAMIN B-12) 1000 MCG
1 TABLET ORAL DAILY
Qty: 90 TABLET | Refills: 3 | Status: SHIPPED | OUTPATIENT
Start: 2020-10-04 | End: 2022-10-28 | Stop reason: SDUPTHER

## 2020-10-05 ENCOUNTER — TELEPHONE (OUTPATIENT)
Dept: INTERNAL MEDICINE | Facility: CLINIC | Age: 80
End: 2020-10-05

## 2020-10-05 NOTE — TELEPHONE ENCOUNTER
"----- Message from Estephania Wolfe MD sent at 10/4/2020 11:13 AM EDT -----  Please call patient's daughter, vitamin D is very very deficient.  Level is 4.6.  Goal is greater than 50 to prevent osteoporosis.  Improving vitamin D level also improves depression and sense of wellbeing and heart function, normal also\" normal.    B12 level is mildly low.    Kidney function has improved some since last time.  Continue to encourage drinking a lot of water.  Add a 5000 unit vitamin D3 tablet daily.  B12 level, blood sugar, liver enzymes, and blood cell counts are all acceptable.  Take the B12 tablet daily.  I sent both to the pharmacy as a prescription, although they may be purchased over-the-counter.  "

## 2020-10-08 NOTE — TELEPHONE ENCOUNTER
Dakota returned call.  Advised her of Dr Wolfe review of labs and orders.  Dakota verbalized understanding and will satart meds as soon as she can get to the pharmacy to pick them up.

## 2020-10-17 ENCOUNTER — TELEPHONE (OUTPATIENT)
Dept: INTERNAL MEDICINE | Facility: CLINIC | Age: 80
End: 2020-10-17

## 2020-10-17 NOTE — TELEPHONE ENCOUNTER
"Received a fax from [facility] wanting an \"alternative\" for quetiapine. I sent it on 9/28/20 at her OV.    Ask pharmacy if they already picked it up and how much it was. If <$40 we will keep same med.  "

## 2020-10-20 DIAGNOSIS — R44.3 HALLUCINATIONS: Primary | ICD-10-CM

## 2020-10-20 RX ORDER — RISPERIDONE 0.25 MG/1
0.25 TABLET ORAL NIGHTLY
Qty: 30 TABLET | Refills: 5 | Status: SHIPPED | OUTPATIENT
Start: 2020-10-20 | End: 2020-11-09

## 2020-10-20 NOTE — TELEPHONE ENCOUNTER
Called [facility] talked to Shawn he said patient has not picked up the Quetiapine. He said insurance will only pay if she has a Dx of psychosis so patient will need an alternative.     Answer at your convenience

## 2020-10-20 NOTE — TELEPHONE ENCOUNTER
I sent Risperdal to take nightly.  (Instead of quetiapine) it appears to be covered by her insurance.  Please call the daughter and let her know.

## 2020-10-29 ENCOUNTER — PATIENT OUTREACH (OUTPATIENT)
Dept: CASE MANAGEMENT | Facility: OTHER | Age: 80
End: 2020-10-29

## 2020-10-29 ENCOUNTER — EPISODE CHANGES (OUTPATIENT)
Dept: CASE MANAGEMENT | Facility: OTHER | Age: 80
End: 2020-10-29

## 2020-10-29 NOTE — OUTREACH NOTE
"Patient Outreach Note    SW received a call from pt's daughter Alyssa asking for information regarding the Medicaid Waiver program. SW provided Alyssa with the phone number and told her if she has any questions or needs other resources to give me a call. Alyssa stated, \" Ok thank you so much\".     Marichuy Carbajal, ARCENIO  Ambulatory     10/29/2020, 14:58 EDT      "

## 2020-11-03 ENCOUNTER — TELEPHONE (OUTPATIENT)
Dept: INTERNAL MEDICINE | Facility: CLINIC | Age: 80
End: 2020-11-03

## 2020-11-03 PROBLEM — E55.9 VITAMIN D DEFICIENCY: Chronic | Status: ACTIVE | Noted: 2020-11-03

## 2020-11-03 NOTE — TELEPHONE ENCOUNTER
Diagnosis code needed for Vitamin D level ordered 9/28/2020    Patients Vitamin D level was low & her GFR is low.  I do not see either diagnosis in her problem list to add      If appropriate can you add and route message back for me to add on [facility] form

## 2020-11-05 ENCOUNTER — PATIENT OUTREACH (OUTPATIENT)
Dept: CASE MANAGEMENT | Facility: OTHER | Age: 80
End: 2020-11-05

## 2020-11-05 NOTE — OUTREACH NOTE
"Patient Outreach Note    Pt's daughter Alyssa reached out to SW to follow up. Alyssa stated. \" Thank you for the information regarding Medicaid Waiver but is there someone that can come and help me bathe her until the Medicaid Waiver services kicks in?\".  provided Alyssa with a list of personal care agencies that can assist with bathing, cooking, cleaning and errands. Alyssa said, \" Ok great thank you for this information\".     ARCENIO Ha  Ambulatory     11/5/2020, 12:17 EST      "

## 2020-11-09 ENCOUNTER — OFFICE VISIT (OUTPATIENT)
Dept: INTERNAL MEDICINE | Facility: CLINIC | Age: 80
End: 2020-11-09

## 2020-11-09 VITALS
SYSTOLIC BLOOD PRESSURE: 122 MMHG | HEART RATE: 104 BPM | DIASTOLIC BLOOD PRESSURE: 88 MMHG | HEIGHT: 65 IN | WEIGHT: 153 LBS | BODY MASS INDEX: 25.49 KG/M2 | TEMPERATURE: 97.5 F

## 2020-11-09 DIAGNOSIS — Z91.81 RISK FOR FALLS: ICD-10-CM

## 2020-11-09 DIAGNOSIS — F02.80 LATE ONSET ALZHEIMER'S DISEASE WITHOUT BEHAVIORAL DISTURBANCE (HCC): Primary | ICD-10-CM

## 2020-11-09 DIAGNOSIS — R44.3 HALLUCINATIONS: ICD-10-CM

## 2020-11-09 DIAGNOSIS — G30.1 LATE ONSET ALZHEIMER'S DISEASE WITHOUT BEHAVIORAL DISTURBANCE (HCC): Primary | ICD-10-CM

## 2020-11-09 DIAGNOSIS — Z00.00 MEDICARE ANNUAL WELLNESS VISIT, INITIAL: ICD-10-CM

## 2020-11-09 DIAGNOSIS — F32.1 DEPRESSION, MAJOR, SINGLE EPISODE, MODERATE (HCC): ICD-10-CM

## 2020-11-09 DIAGNOSIS — E78.2 MIXED HYPERLIPIDEMIA: ICD-10-CM

## 2020-11-09 DIAGNOSIS — R26.9 GAIT DISTURBANCE: ICD-10-CM

## 2020-11-09 DIAGNOSIS — E55.9 VITAMIN D DEFICIENCY: Chronic | ICD-10-CM

## 2020-11-09 DIAGNOSIS — Z00.00 ANNUAL PHYSICAL EXAM: ICD-10-CM

## 2020-11-09 PROCEDURE — G0439 PPPS, SUBSEQ VISIT: HCPCS | Performed by: INTERNAL MEDICINE

## 2020-11-09 PROCEDURE — 99397 PER PM REEVAL EST PAT 65+ YR: CPT | Performed by: INTERNAL MEDICINE

## 2020-11-09 PROCEDURE — 96160 PT-FOCUSED HLTH RISK ASSMT: CPT | Performed by: INTERNAL MEDICINE

## 2020-11-09 RX ORDER — OLOPATADINE HYDROCHLORIDE 1 MG/ML
1 SOLUTION/ DROPS OPHTHALMIC 2 TIMES DAILY PRN
Qty: 1 EACH | Refills: 12 | Status: SHIPPED | OUTPATIENT
Start: 2020-11-09

## 2020-11-09 RX ORDER — ZIPRASIDONE HYDROCHLORIDE 20 MG/1
20 CAPSULE ORAL
Qty: 30 CAPSULE | Refills: 5 | Status: SHIPPED | OUTPATIENT
Start: 2020-11-09 | End: 2021-06-02

## 2020-11-09 NOTE — PATIENT INSTRUCTIONS
Patient Instructions   Problem List Items Addressed This Visit        Cardiovascular and Mediastinum    Mixed hyperlipidemia    Overview     11/9/2020 Estephania Wolfe MD    Continue pravastatin every evening.  Continue low-fat diet.         Relevant Medications    pravastatin (PRAVACHOL) 20 MG tablet       Digestive    Vitamin D deficiency (Chronic)       Nervous and Auditory    Late onset Alzheimer's disease without behavioral disturbance (CMS/HCC)    Overview     9/28/2020 Estephania Wolfe MD    Add quetiapine every evening to decrease hallucinations and help her sleep through the night so she is not getting up wandering around the house and increasing fall risk.     Decrease donepezil to a 5 mg tablet (or half of a 10 mg tablet) every evening to see if loose stools improved.  Continue memantine daily.    Refer to  through case management to see if she can help the family get Medicaid in place and explore long-term facilities to take care of her.  It is an unsafe situation with her being at home all day by herself that all of the family members work.         Relevant Medications    memantine (NAMENDA XR) 14 MG capsule sustained-release 24 hr extended release capsule    escitalopram (LEXAPRO) 20 MG tablet    donepezil (ARICEPT) 5 MG tablet    risperiDONE (RisperDAL) 0.25 MG tablet       Other    Risk for falls    Overview     9/28/2020 Estephania Wolfe MD    She is at high risk for falls.  Discussed fall prevention with daughter.         Gait disturbance    Overview     9/28/2020 Estephania Wolfe MD    Doing some daily exercises can help prevent falls.  The family could help her do some exercises while sitting every day.         Depression, major, single episode, moderate (CMS/HCC)    Overview     9/28/2020 Estephania Wolfe MD    Continue escitalopram (Lexapro) daily.         Relevant Medications    memantine (NAMENDA XR) 14 MG capsule sustained-release 24 hr extended release capsule     escitalopram (LEXAPRO) 20 MG tablet    donepezil (ARICEPT) 5 MG tablet    risperiDONE (RisperDAL) 0.25 MG tablet    Hallucinations    Overview     9/28/2020 Estephania Wolfe MD    Add quetiapine every evening.         Relevant Medications    risperiDONE (RisperDAL) 0.25 MG tablet    Medicare annual wellness visit, initial - Primary    Annual physical exam        Patient Instructions   Problem List Items Addressed This Visit        Cardiovascular and Mediastinum    Mixed hyperlipidemia    Overview     11/9/2020 Estephania Wolfe MD    Continue pravastatin every evening.  Continue low-fat diet.         Relevant Medications    pravastatin (PRAVACHOL) 20 MG tablet       Digestive    Vitamin D deficiency (Chronic)       Nervous and Auditory    Late onset Alzheimer's disease without behavioral disturbance (CMS/HCC)    Overview     9/28/2020 Estephania Wolfe MD    Add quetiapine every evening to decrease hallucinations and help her sleep through the night so she is not getting up wandering around the house and increasing fall risk.     Decrease donepezil to a 5 mg tablet (or half of a 10 mg tablet) every evening to see if loose stools improved.  Continue memantine daily.    Refer to  through case management to see if she can help the family get Medicaid in place and explore long-term facilities to take care of her.  It is an unsafe situation with her being at home all day by herself that all of the family members work.         Relevant Medications    memantine (NAMENDA XR) 14 MG capsule sustained-release 24 hr extended release capsule    escitalopram (LEXAPRO) 20 MG tablet    donepezil (ARICEPT) 5 MG tablet    risperiDONE (RisperDAL) 0.25 MG tablet       Other    Risk for falls    Overview     9/28/2020 Estephania Wolfe MD    She is at high risk for falls.  Discussed fall prevention with daughter.         Gait disturbance    Overview     9/28/2020 Estephania Wolfe MD    Doing some daily exercises can help  prevent falls.  The family could help her do some exercises while sitting every day.         Depression, major, single episode, moderate (CMS/HCC)    Overview     9/28/2020 Estephania Wolfe MD    Continue escitalopram (Lexapro) daily.         Relevant Medications    memantine (NAMENDA XR) 14 MG capsule sustained-release 24 hr extended release capsule    escitalopram (LEXAPRO) 20 MG tablet    donepezil (ARICEPT) 5 MG tablet    risperiDONE (RisperDAL) 0.25 MG tablet    Hallucinations    Overview     9/28/2020 Estephania Wolfe MD    Add quetiapine every evening.         Relevant Medications    risperiDONE (RisperDAL) 0.25 MG tablet    Medicare annual wellness visit, initial - Primary    Annual physical exam        Patient Instructions   Problem List Items Addressed This Visit        Cardiovascular and Mediastinum    Mixed hyperlipidemia    Overview     11/9/2020 Estephania Wolfe MD    Continue pravastatin every evening.  Continue low-fat diet.         Relevant Medications    pravastatin (PRAVACHOL) 20 MG tablet       Digestive    Vitamin D deficiency (Chronic)       Nervous and Auditory    Late onset Alzheimer's disease without behavioral disturbance (CMS/[facility])    Overview     9/28/2020 Estephania Wolfe MD    Add quetiapine every evening to decrease hallucinations and help her sleep through the night so she is not getting up wandering around the house and increasing fall risk.     Decrease donepezil to a 5 mg tablet (or half of a 10 mg tablet) every evening to see if loose stools improved.  Continue memantine daily.    Refer to  through case management to see if she can help the family get Medicaid in place and explore long-term facilities to take care of her.  It is an unsafe situation with her being at home all day by herself that all of the family members work.         Relevant Medications    memantine (NAMENDA XR) 14 MG capsule sustained-release 24 hr extended release capsule    escitalopram  (LEXAPRO) 20 MG tablet    donepezil (ARICEPT) 5 MG tablet    risperiDONE (RisperDAL) 0.25 MG tablet       Other    Risk for falls    Overview     9/28/2020 Estephania Wolfe MD    She is at high risk for falls.  Discussed fall prevention with daughter.         Gait disturbance    Overview     9/28/2020 Estephania Wolfe MD    Doing some daily exercises can help prevent falls.  The family could help her do some exercises while sitting every day.         Depression, major, single episode, moderate (CMS/HCC)    Overview     9/28/2020 Estephania Wolfe MD    Continue escitalopram (Lexapro) daily.         Relevant Medications    memantine (NAMENDA XR) 14 MG capsule sustained-release 24 hr extended release capsule    escitalopram (LEXAPRO) 20 MG tablet    donepezil (ARICEPT) 5 MG tablet    risperiDONE (RisperDAL) 0.25 MG tablet    Hallucinations    Overview     9/28/2020 Estephania Wolfe MD    Add quetiapine every evening.         Relevant Medications    risperiDONE (RisperDAL) 0.25 MG tablet    Medicare annual wellness visit, initial - Primary    Annual physical exam          Fall Prevention in the Home, Adult  Falls can cause injuries. They can happen to people of all ages. There are many things you can do to make your home safe and to help prevent falls. Ask for help when making these changes, if needed.  What actions can I take to prevent falls?  General Instructions  · Use good lighting in all rooms. Replace any light bulbs that burn out.  · Turn on the lights when you go into a dark area. Use night-lights.  · Keep items that you use often in easy-to-reach places. Lower the shelves around your home if necessary.  · Set up your furniture so you have a clear path. Avoid moving your furniture around.  · Do not have throw rugs and other things on the floor that can make you trip.  · Avoid walking on wet floors.  · If any of your floors are uneven, fix them.  · Add color or contrast paint or tape to clearly pako and help  you see:  ? Any grab bars or handrails.  ? First and last steps of stairways.  ? Where the edge of each step is.  · If you use a stepladder:  ? Make sure that it is fully opened. Do not climb a closed stepladder.  ? Make sure that both sides of the stepladder are locked into place.  ? Ask someone to hold the stepladder for you while you use it.  · If there are any pets around you, be aware of where they are.  What can I do in the bathroom?         · Keep the floor dry. Clean up any water that spills onto the floor as soon as it happens.  · Remove soap buildup in the tub or shower regularly.  · Use non-skid mats or decals on the floor of the tub or shower.  · Attach bath mats securely with double-sided, non-slip rug tape.  · If you need to sit down in the shower, use a plastic, non-slip stool.  · Install grab bars by the toilet and in the tub and shower. Do not use towel bars as grab bars.  What can I do in the bedroom?  · Make sure that you have a light by your bed that is easy to reach.  · Do not use any sheets or blankets that are too big for your bed. They should not hang down onto the floor.  · Have a firm chair that has side arms. You can use this for support while you get dressed.  What can I do in the kitchen?  · Clean up any spills right away.  · If you need to reach something above you, use a strong step stool that has a grab bar.  · Keep electrical cords out of the way.  · Do not use floor polish or wax that makes floors slippery. If you must use wax, use non-skid floor wax.  What can I do with my stairs?  · Do not leave any items on the stairs.  · Make sure that you have a light switch at the top of the stairs and the bottom of the stairs. If you do not have them, ask someone to add them for you.  · Make sure that there are handrails on both sides of the stairs, and use them. Fix handrails that are broken or loose. Make sure that handrails are as long as the stairways.  · Install non-slip stair treads on  all stairs in your home.  · Avoid having throw rugs at the top or bottom of the stairs. If you do have throw rugs, attach them to the floor with carpet tape.  · Choose a carpet that does not hide the edge of the steps on the stairway.  · Check any carpeting to make sure that it is firmly attached to the stairs. Fix any carpet that is loose or worn.  What can I do on the outside of my home?  · Use bright outdoor lighting.  · Regularly fix the edges of walkways and driveways and fix any cracks.  · Remove anything that might make you trip as you walk through a door, such as a raised step or threshold.  · Trim any bushes or trees on the path to your home.  · Regularly check to see if handrails are loose or broken. Make sure that both sides of any steps have handrails.  · Install guardrails along the edges of any raised decks and porches.  · Clear walking paths of anything that might make someone trip, such as tools or rocks.  · Have any leaves, snow, or ice cleared regularly.  · Use sand or salt on walking paths during winter.  · Clean up any spills in your garage right away. This includes grease or oil spills.  What other actions can I take?  · Wear shoes that:  ? Have a low heel. Do not wear high heels.  ? Have rubber bottoms.  ? Are comfortable and fit you well.  ? Are closed at the toe. Do not wear open-toe sandals.  · Use tools that help you move around (mobility aids) if they are needed. These include:  ? Canes.  ? Walkers.  ? Scooters.  ? Crutches.  · Review your medicines with your doctor. Some medicines can make you feel dizzy. This can increase your chance of falling.  Ask your doctor what other things you can do to help prevent falls.  Where to find more information  · Centers for Disease Control and Prevention, [employer]: https://cdc.gov  · National [place] on Aging: https://rw7dyxb.romy.nih.gov  Contact a doctor if:  · You are afraid of falling at home.  · You feel weak, drowsy, or dizzy at home.  · You fall  at home.  Summary  · There are many simple things that you can do to make your home safe and to help prevent falls.  · Ways to make your home safe include removing tripping hazards and installing grab bars in the bathroom.  · Ask for help when making these changes in your home.  This information is not intended to replace advice given to you by your health care provider. Make sure you discuss any questions you have with your health care provider.  Document Released: 10/14/2010 Document Revised: 04/09/2020 Document Reviewed: 08/02/2018  Elsevier Patient Education © 2020 [employer]    Dementia Caregiver Guide  Dementia is a term used to describe a number of symptoms that affect memory and thinking. The most common symptoms include:  · Memory loss.  · Trouble with language and communication.  · Trouble concentrating.  · Poor judgment.  · Problems with reasoning.  · Child-like behavior and language.  · Extreme anxiety.  · Angry outbursts.  · Wandering from home or public places.  Dementia usually gets worse slowly over time. In the early stages, people with dementia can stay independent and safe with some help. In later stages, they need help with daily tasks such as dressing, grooming, and using the bathroom.  How to help the person with dementia cope  Dementia can be frightening and confusing. Here are some tips to help the person with dementia cope with changes caused by the disease.  General tips  · Keep the person on track with his or her routine.  · Try to identify areas where the person may need help.  · Be supportive, patient, calm, and encouraging.  · Gently remind the person that adjusting to changes takes time.  · Help with the tasks that the person has asked for help with.  · Keep the person involved in daily tasks and decisions as much as possible.  · Encourage conversation, but try not to get frustrated or harried if the person struggles to find words or does not seem to appreciate your  help.  Communication tips  · When the person is talking or seems frustrated, make eye contact and hold the person's hand.  · Ask specific questions that need yes or no answers.  · Use simple words, short sentences, and a calm voice. Only give one direction at a time.  · When offering choices, limit them to just 1 or 2.  · Avoid correcting the person in a negative way.  · If the person is struggling to find the right words, gently try to help him or her.  How to recognize symptoms of stress  Symptoms of stress in caregivers include:  · Feeling frustrated or angry with the person with dementia.  · Denying that the person has dementia or that his or her symptoms will not improve.  · Feeling hopeless and unappreciated.  · Difficulty sleeping.  · Difficulty concentrating.  · Feeling anxious, irritable, or depressed.  · Developing stress-related health problems.  · Feeling like you have too little time for your own life.  Follow these instructions at home:    · Make sure that you and the person you are caring for:  ? Get regular sleep.  ? Exercise regularly.  ? Eat regular, nutritious meals.  ? Drink enough fluid to keep your urine clear or pale yellow.  ? Take over-the-counter and prescription medicines only as told by your health care providers.  ? Attend all scheduled health care appointments.  · Join a support group with others who are caregivers.  · Ask about respite care resources so that you can have a regular break from the stress of caregiving.  · Look for signs of stress in yourself and in the person you are caring for. If you notice signs of stress, take steps to manage it.  · Consider any safety risks and take steps to avoid them.  · Organize medications in a pill box for each day of the week.  · Create a plan to handle any legal or financial matters. Get legal or financial advice if needed.  · Keep a calendar in a central location to remind the person of appointments or other activities.  Tips for reducing  the risk of injury  · Keep floors clear of clutter. Remove rugs, magazine racks, and floor lamps.  · Keep hallways well lit, especially at night.  · Put a handrail and nonslip mat in the bathtub or shower.  · Put childproof locks on cabinets that contain dangerous items, such as medicines, alcohol, guns, toxic cleaning items, sharp tools or utensils, matches, and lighters.  · Put the locks in places where the person cannot see or reach them easily. This will help ensure that the person does not wander out of the house and get lost.  · Be prepared for emergencies. Keep a list of emergency phone numbers and addresses in a convenient area.  · Remove car keys and lock garage doors so that the person does not try to get in the car and drive.  · Have the person wear a bracelet that tracks locations and identifies the person as having memory problems. This should be worn at all times for safety.  Where to find support:  Many individuals and organizations offer support. These include:  · Support groups for people with dementia and for caregivers.  · Counselors or therapists.  · Home health care services.  · Adult day care centers.  Where to find more information  Alzheimer[employer] Association: www.alz.org  Contact a health care provider if:  · The person's health is rapidly getting worse.  · You are no longer able to care for the person.  · Caring for the person is affecting your physical and emotional health.  · The person threatens himself or herself, you, or anyone else.  Summary  · Dementia is a term used to describe a number of symptoms that affect memory and thinking.  · Dementia usually gets worse slowly over time.  · Take steps to reduce the person's risk of injury, and to plan for future care.  · Caregivers need support, relief from caregiving, and time for their own lives.  This information is not intended to replace advice given to you by your health care provider. Make sure you discuss any questions you have with your  health care provider.  Document Released: 11/21/2017 Document Revised: 11/30/2018 Document Reviewed: 11/21/2017  Elsevier Patient Education © 2020 [employer]

## 2020-11-09 NOTE — PROGRESS NOTES
The ABCs of the Annual Wellness Visit  Initial Medicare Wellness Visit    Chief Complaint   Patient presents with   • Annual Exam     AWV  Part 1 & 2        Subjective   History of Present Illness:  Jia Bright is a 79 y.o. female who presents for an Initial Medicare Wellness Visit and follow-up chronic conditions including hyperlipidemia, dementia, depression, gait disturbance.    CHRONIC CONDITIONS:    Memory problems and cognitive problems continue to decline very gradually.  She has hallucinations some evenings.  Not much in the way of agitation but she does get frustrated sometimes.  She is cooperative with the family.  She is at home alone quite a bit during the day due to all the family members working.    They were not able to get quetiapine or risperidone because the pharmacy said she would have to have a diagnosis of psychosis to get those.    They did decrease her donepezil to 5 mg every evening and the loose stools are better.  She does not have any nausea with it.  Appetite is good.    They did start a B12 gummy and a vitamin D tablet.    She and her daughter agree that she does not really feel depressed.  They would like to leave the escitalopram at the current dose.    She takes pravastatin every evening.  No side effects.  They do try the best they can to eat a low-fat low sugar diet.  She does not get any exercise.  She is sedentary at home.        HEALTH RISK ASSESSMENT    Recent Hospitalizations:  No hospitalization(s) within the last year.    Current Medical Providers:  Patient Care Team:  Estephania Wolfe MD as PCP - General (Internal Medicine)  Marichuy Carbajal MSW as Ambulatory  ()    Smoking Status:  Social History     Tobacco Use   Smoking Status Former Smoker   • Packs/day: 1.50   • Years: 56.00   • Pack years: 84.00   • Types: Cigarettes   • Quit date: 2017   • Years since quitting: 3.8   Smokeless Tobacco Never Used   Tobacco Comment    4 cig a day         Alcohol Consumption:  Social History     Substance and Sexual Activity   Alcohol Use No   • Frequency: Never       Depression Screen:   PHQ-2/PHQ-9 Depression Screening 11/9/2020   Little interest or pleasure in doing things 0   Feeling down, depressed, or hopeless 2   Trouble falling or staying asleep, or sleeping too much 2   Feeling tired or having little energy 1   Poor appetite or overeating 0   Feeling bad about yourself - or that you are a failure or have let yourself or your family down 0   Trouble concentrating on things, such as reading the newspaper or watching television 0   Moving or speaking so slowly that other people could have noticed. Or the opposite - being so fidgety or restless that you have been moving around a lot more than usual 0   Thoughts that you would be better off dead, or of hurting yourself in some way 0   Total Score 5   If you checked off any problems, how difficult have these problems made it for you to do your work, take care of things at home, or get along with other people? Not difficult at all       Fall Risk Screen:  STEADI Fall Risk Assessment was completed, and patient is at MODERATE risk for falls. Assessment completed on:11/9/2020    Health Habits and Functional and Cognitive Screening:  Functional & Cognitive Status 11/9/2020   Do you have difficulty preparing food and eating? Yes   Do you have difficulty bathing yourself, getting dressed or grooming yourself? Yes   Do you have difficulty using the toilet? Yes   Do you have difficulty moving around from place to place? Yes   Do you have trouble with steps or getting out of a bed or a chair? Yes   Current Diet Well Balanced Diet   Dental Exam (No Data)        Dental Exam Comment dentures   Eye Exam Not up to date   Exercise (times per week) 0 times per week   Current Exercise Activities Include None   Do you need help using the phone?  Yes   Are you deaf or do you have serious difficulty hearing?  No   Do you need  help with transportation? Yes   Do you need help shopping? Yes   Do you need help preparing meals?  Yes   Do you need help with housework?  Yes   Do you need help with laundry? Yes   Do you need help taking your medications? Yes   Do you need help managing money? Yes   Do you ever drive or ride in a car without wearing a seat belt? No   Have you felt unusual stress, anger or loneliness in the last month? No   Who do you live with? Child   If you need help, do you have trouble finding someone available to you? Yes   Have you been bothered in the last four weeks by sexual problems? No   Do you have difficulty concentrating, remembering or making decisions? Yes       Does the patient have evidence of cognitive impairment? Yes    Asprin use counseling:Does not need ASA (and currently is not on it)    Age-appropriate Screening Schedule:  Refer to the list below for future screening recommendations based on patient's age, sex and/or medical conditions. Orders for these recommended tests are listed in the plan section. The patient has been provided with a written plan.    Age appropriate preventive counseling done including age appropriate ,regular dental visits, mental health, injury prevention such as wearing seat belt and preventing falls, healthy  nutrition, healthy weight, regular physical exercise. Alcohol use is none.  Tobacco history-none. Drug use-none.  STD's-not at risk.          Health Maintenance   Topic Date Due   • TDAP/TD VACCINES (1 - Tdap) 11/16/1960   • ZOSTER VACCINE (1 of 2) 11/16/1991   • LIPID PANEL  01/25/2020   • INFLUENZA VACCINE  Completed        The following portions of the patient's history were reviewed and updated as appropriate: allergies, current medications, past family history, past medical history, past social history, past surgical history and problem list.    Outpatient Medications Prior to Visit   Medication Sig Dispense Refill   • Calcium Carb-Cholecalciferol (OS-OSCAR CALCIUM + D3)  500-200 MG-UNIT tablet Take 1 tablet by mouth Daily. 60 tablet 11   • Cholecalciferol (Vitamin D3) 125 MCG (5000 UT) tablet dispersible Place 1 tablet on the tongue Daily. 90 tablet 3   • Cyanocobalamin (B-12) 1000 MCG tablet Take 1 tablet by mouth Daily. 90 tablet 3   • donepezil (ARICEPT) 5 MG tablet Take 1 tablet by mouth Every Evening. 90 tablet 1   • escitalopram (LEXAPRO) 20 MG tablet Take 1 tablet by mouth Daily. 90 tablet 1   • memantine (NAMENDA XR) 14 MG capsule sustained-release 24 hr extended release capsule TAKE 1 CAPSULE BY MOUTH EVERY DAY 90 capsule 1   • MULTIPLE VITAMIN PO Take 1 tablet by mouth Daily.     • nystatin (MYCOSTATIN) 682455 UNIT/GM cream apply by topical route 2 times every day to the affected area(s)     • pravastatin (PRAVACHOL) 20 MG tablet TAKE ONE TABLET BY MOUTH EVERY EVENING 90 tablet 10   • risperiDONE (RisperDAL) 0.25 MG tablet Take 1 tablet by mouth Every Night. 30 tablet 5     No facility-administered medications prior to visit.        Patient Active Problem List   Diagnosis   • Late onset Alzheimer's disease without behavioral disturbance (CMS/HCC)   • Risk for falls   • Gait disturbance   • Mixed hyperlipidemia   • Depression, major, single episode, moderate (CMS/HCC)   • B12 deficiency   • Shortness of breath on exertion   • Menopausal and perimenopausal disorder   • Visit for screening mammogram   • Frequent unifocal PVCs   • Frequent falls   • Hallucinations   • Abnormal urine odor   • Vitamin D deficiency   • Medicare annual wellness visit, initial   • Annual physical exam       Advanced Care Planning:  ACP discussion was held with the patient during this visit. Patient does not have an advance directive, information provided.    Review of Systems   Constitutional: Negative for chills, fatigue and fever.   HENT: Negative for congestion, ear pain and sinus pressure.    Eyes: Negative for visual disturbance.   Respiratory: Positive for wheezing. Negative for cough, chest  "tightness and shortness of breath.    Cardiovascular: Negative for chest pain, palpitations and leg swelling.   Gastrointestinal: Negative for abdominal pain, blood in stool, constipation and diarrhea.   Endocrine: Negative for cold intolerance and heat intolerance.   Genitourinary: Negative for dysuria and frequency.        Urine incontinence   Musculoskeletal: Negative for arthralgias and back pain.   Skin: Negative for color change and rash.   Allergic/Immunologic: Negative for environmental allergies.   Neurological: Negative for dizziness, speech difficulty, numbness and headaches.        Memory loss   Hematological: Negative for adenopathy. Does not bruise/bleed easily.   Psychiatric/Behavioral: Negative for confusion, dysphoric mood and suicidal ideas. The patient is not nervous/anxious.        Compared to one year ago, the patient feels her physical health is the same.  Compared to one year ago, the patient feels her mental health is the same.    Reviewed chart for potential of high risk medication in the elderly: yes  Reviewed chart for potential of harmful drug interactions in the elderly:yes    Objective         Vitals:    11/09/20 1650   BP: 122/88   Pulse: 104   Temp: 97.5 °F (36.4 °C)   Weight: 69.4 kg (153 lb)   Height: 165.4 cm (65.12\")   PainSc: 0-No pain       Body mass index is 25.37 kg/m².  Discussed the patient's BMI with her. The BMI is in the acceptable range.    Physical Exam  Vitals signs and nursing note reviewed.   Constitutional:       Appearance: She is well-developed.   HENT:      Head: Normocephalic.   Eyes:      Conjunctiva/sclera: Conjunctivae normal.      Pupils: Pupils are equal, round, and reactive to light.   Neck:      Musculoskeletal: Normal range of motion and neck supple.      Thyroid: No thyromegaly.   Cardiovascular:      Rate and Rhythm: Normal rate and regular rhythm.      Heart sounds: Normal heart sounds.   Pulmonary:      Effort: Pulmonary effort is normal.      Breath " sounds: Normal breath sounds. No wheezing.   Chest:      Breasts:         Right: No inverted nipple, mass, nipple discharge, skin change or tenderness.         Left: No inverted nipple, mass, nipple discharge, skin change or tenderness.   Abdominal:      General: Bowel sounds are normal.      Palpations: Abdomen is soft.      Tenderness: There is no abdominal tenderness.   Musculoskeletal: Normal range of motion.         General: No tenderness.   Lymphadenopathy:      Cervical: No cervical adenopathy.   Skin:     General: Skin is warm and dry.      Findings: No rash.   Neurological:      Mental Status: She is alert.      Cranial Nerves: No cranial nerve deficit.      Sensory: No sensory deficit.      Coordination: Coordination normal.      Gait: Gait abnormal.   Psychiatric:         Attention and Perception: Attention normal.         Mood and Affect: Mood and affect normal.         Speech: Speech normal.         Behavior: Behavior normal.         Thought Content: Thought content normal.         Cognition and Memory: Cognition is impaired. Memory is impaired.         Lab Results   Component Value Date    GLU 93 09/28/2020        Assessment/Plan   Medicare Risks and Personalized Health Plan  CMS Preventative Services Quick Reference  Cardiovascular risk  Osteoprorosis Risk    The above risks/problems have been discussed with the patient.  Pertinent information has been shared with the patient in the After Visit Summary.  Follow up plans and orders are seen below in the Assessment/Plan Section.  Patient Instructions   Problem List Items Addressed This Visit        Cardiovascular and Mediastinum    Mixed hyperlipidemia    Overview     11/9/2020 Estephania Wolfe MD    Continue pravastatin every evening.  Continue low-fat diet.         Relevant Medications    pravastatin (PRAVACHOL) 20 MG tablet       Digestive    Vitamin D deficiency (Chronic)       Nervous and Auditory    Late onset Alzheimer's disease without  behavioral disturbance (CMS/HCC) - Primary    Overview     11/9/2020 Estephania Wolfe MD    Take ziprasidone (Geodon) every evening with dinner to decrease hallucinations and help her sleep through the night so she is not getting up wandering around the house and increasing fall risk.  It also helps with agitation and behavioral problems related to dementia.    Continue donepezil  5 mg tablet  every evening.  Continue memantine daily.    We placed day referral to  through case management last appointment to  see if she can help the family get Medicaid in place and explore long-term facilities to take care of her.  It is an unsafe situation with her being at home all day by herself since all of the family members work. Marichuy has been in communication with the family.         Relevant Medications    memantine (NAMENDA XR) 14 MG capsule sustained-release 24 hr extended release capsule    escitalopram (LEXAPRO) 20 MG tablet    donepezil (ARICEPT) 5 MG tablet    ziprasidone (Geodon) 20 MG capsule    Other Relevant Orders    Lightweight Wheelchair       Other    Risk for falls    Overview     11/9/2020 Estephania Wolfe MD    She is at high risk for falls.  Discussed fall prevention with daughter.         Relevant Orders    Lightweight Wheelchair    Gait disturbance    Overview     11/9/2020 Estephania Wolfe MD    Doing some daily exercises can help prevent falls.  The family could help her do some exercises while sitting every day.    Prescription given today for a wheelchair to help the family with transporting the patient.         Relevant Orders    Lightweight Wheelchair    Depression, major, single episode, moderate (CMS/HCC)    Overview     11/9/2020 Estephania Wolfe MD    Continue escitalopram (Lexapro) daily.         Relevant Medications    memantine (NAMENDA XR) 14 MG capsule sustained-release 24 hr extended release capsule    escitalopram (LEXAPRO) 20 MG tablet    donepezil (ARICEPT) 5 MG tablet     ziprasidone (Geodon) 20 MG capsule    Hallucinations    Overview     11/9/2020 Estephania Wolfe MD     Take ziprasidone (Geodon) every evening with dinner to help with hallucinations and agitation and behavioral problems related to dementia.         Medicare annual wellness visit, initial    Overview     11/9/2020 Estephania Wolfe MD    She is up-to-date on vaccinations including the flu shot.  Colonoscopy and mammogram are not indicated at this age.         Annual physical exam    Overview     11/9/2020 Estephania Wolfe MD    She is up-to-date on vaccinations including the flu shot.  Colonoscopy and mammogram are not indicated at this age.                  Follow Up:  Return in about 4 months (around 3/9/2021) for Recheck.     An After Visit Summary and PPPS were given to the patient.

## 2020-12-04 ENCOUNTER — PATIENT OUTREACH (OUTPATIENT)
Dept: CASE MANAGEMENT | Facility: OTHER | Age: 80
End: 2020-12-04

## 2020-12-04 NOTE — OUTREACH NOTE
"Patient Outreach Note    PARKER reached out to pt's daughter Alyssa to follow up. Alyssa stated, \" I haven't had time to call the numbers you gave me for Medicaid. I thought my sister was taking care of this part but she hasn't\".  PARKER told Alyssa the number for Medicaid Insurance is [phone] and the number for Medicaid Waiver program is [phone]. PARKER encouraged Alyssa to call and get pt started on these programs. Alyssa stated, \" Ok thank you\".     ARCENIO Ha  Ambulatory     12/4/2020, 13:17 EST      "

## 2020-12-10 ENCOUNTER — TELEPHONE (OUTPATIENT)
Dept: INTERNAL MEDICINE | Facility: CLINIC | Age: 80
End: 2020-12-10

## 2020-12-10 NOTE — TELEPHONE ENCOUNTER
Patient's daughter, Alyssa, states that she needs a prescription along with her demographics and last office visit, for a wheelchair faxed to [facility] [phone].  Phone Number is [phone].  She can be reached at [phone]

## 2020-12-11 DIAGNOSIS — F02.80 LATE ONSET ALZHEIMER'S DISEASE WITHOUT BEHAVIORAL DISTURBANCE (HCC): Primary | ICD-10-CM

## 2020-12-11 DIAGNOSIS — G30.1 LATE ONSET ALZHEIMER'S DISEASE WITHOUT BEHAVIORAL DISTURBANCE (HCC): Primary | ICD-10-CM

## 2020-12-11 DIAGNOSIS — R26.9 GAIT DISTURBANCE: ICD-10-CM

## 2021-01-08 ENCOUNTER — PATIENT OUTREACH (OUTPATIENT)
Dept: CASE MANAGEMENT | Facility: OTHER | Age: 81
End: 2021-01-08

## 2021-01-08 NOTE — OUTREACH NOTE
"Patient Outreach Note    SW reached out to pt's daughter Alyssa to ask about the wheelchair from [employer]. Alyssa stated, \" Yes we got it but I don't know if we owe on it or not\".  PARKER told Alyssa that pt' insurance should cover most of it and if there is a remaining balance then [employer] [facility] will let you know\". PARKER asked Alyssa if she had any other questions or needed any other resources at this time and Alyssa stated, \"No, but I have saved your number in case we do need something\".     Marichuy Carbajal, ARCENIO  Ambulatory     1/8/2021, 09:36 EST      "

## 2021-03-01 RX ORDER — MEMANTINE HYDROCHLORIDE 14 MG/1
CAPSULE, EXTENDED RELEASE ORAL
Qty: 90 CAPSULE | Refills: 1 | Status: SHIPPED | OUTPATIENT
Start: 2021-03-01 | End: 2021-08-06 | Stop reason: SDUPTHER

## 2021-03-01 RX ORDER — DONEPEZIL HYDROCHLORIDE 5 MG/1
TABLET, FILM COATED ORAL
Qty: 90 TABLET | Refills: 1 | Status: SHIPPED | OUTPATIENT
Start: 2021-03-01 | End: 2021-08-06 | Stop reason: SDUPTHER

## 2021-05-20 ENCOUNTER — TELEPHONE (OUTPATIENT)
Dept: INTERNAL MEDICINE | Facility: CLINIC | Age: 81
End: 2021-05-20

## 2021-05-20 NOTE — TELEPHONE ENCOUNTER
Caller: CANDACE STAN    Relationship: DAUGHTER    Best call back number:[phone]    What is the medical concern/diagnosis: MOTHER IS UNABLE TO TAKE CARE OF HERSELF ANYMORE    What specialty or service is being requested: ASSISTED LIVING      Any additional details:PATIENT'S DAUGHTER CANDACE PIERCE, WOULD LIKE TO SPEAK WITH PROVIDER ABOUT PATIENTS CONDITION AND NEEDS TO KNOW THE STEPS SHE MUST TAKE.

## 2021-05-20 NOTE — TELEPHONE ENCOUNTER
Pt daughter advised that we will have to see the pt and she will have to have the assisted living place send us their packet to fill out. She verbalized understanding.

## 2021-05-25 ENCOUNTER — TELEPHONE (OUTPATIENT)
Dept: INTERNAL MEDICINE | Facility: CLINIC | Age: 81
End: 2021-05-25

## 2021-05-25 NOTE — TELEPHONE ENCOUNTER
Pt daughter spoke with  at [facility] and they told her that we would have to refer the pt there for skilled nursing. Daughter states the pt is just getting worse and worse. Pt is not safe to be alone. Scheduled an appt on 6/2/21 and that note will have to address her needing to be there.

## 2021-05-25 NOTE — TELEPHONE ENCOUNTER
DAUGHTER, CANDACE, YOUR REQUESTING A CALL BACK TO DISCUSS REFERRAL FOR PATIENT TO NURSING HOME.    CALL BACK NUMBER [phone]

## 2021-06-02 ENCOUNTER — OFFICE VISIT (OUTPATIENT)
Dept: INTERNAL MEDICINE | Facility: CLINIC | Age: 81
End: 2021-06-02

## 2021-06-02 VITALS
HEIGHT: 65 IN | TEMPERATURE: 97.7 F | BODY MASS INDEX: 23.82 KG/M2 | SYSTOLIC BLOOD PRESSURE: 119 MMHG | DIASTOLIC BLOOD PRESSURE: 80 MMHG | WEIGHT: 143 LBS | HEART RATE: 53 BPM

## 2021-06-02 DIAGNOSIS — E55.9 VITAMIN D DEFICIENCY: Chronic | ICD-10-CM

## 2021-06-02 DIAGNOSIS — Z91.81 RISK FOR FALLS: ICD-10-CM

## 2021-06-02 DIAGNOSIS — R26.9 GAIT DISTURBANCE: ICD-10-CM

## 2021-06-02 DIAGNOSIS — E78.2 MIXED HYPERLIPIDEMIA: ICD-10-CM

## 2021-06-02 DIAGNOSIS — F02.80 LATE ONSET ALZHEIMER'S DISEASE WITHOUT BEHAVIORAL DISTURBANCE (HCC): Primary | ICD-10-CM

## 2021-06-02 DIAGNOSIS — E53.8 B12 DEFICIENCY: ICD-10-CM

## 2021-06-02 DIAGNOSIS — F32.1 DEPRESSION, MAJOR, SINGLE EPISODE, MODERATE (HCC): ICD-10-CM

## 2021-06-02 DIAGNOSIS — G30.1 LATE ONSET ALZHEIMER'S DISEASE WITHOUT BEHAVIORAL DISTURBANCE (HCC): Primary | ICD-10-CM

## 2021-06-02 PROCEDURE — 99214 OFFICE O/P EST MOD 30 MIN: CPT | Performed by: INTERNAL MEDICINE

## 2021-06-02 RX ORDER — ESCITALOPRAM OXALATE 20 MG/1
20 TABLET ORAL DAILY
Qty: 90 TABLET | Refills: 1 | Status: SHIPPED | OUTPATIENT
Start: 2021-06-02 | End: 2022-10-27

## 2021-06-02 NOTE — PROGRESS NOTES
"[facility]     Jia Bright  1940   [id number]      Patient Care Team:  Estephania Wolfe MD as PCP - General (Internal Medicine)    Chief Complaint   Patient presents with   • nursing home referral            HPI  Patient is a 80 y.o. female presents with Family wants to move her into [facility] nursing.       CHRONIC CONDITIONS  Patient has been saying in bed all day until daughter gets home from work. Even though daughter leaves things out for her to eat, she doesn't eat anything until she gets home at night. Urine incontinence. \"She picks the depends to shreds.\"  Also incontinent of bowel. Daughter comes home from work then has to clean up patient and her room. Sometimes wandering around house at night.  Couple falls. Back hurt but got better with moist heat. Quiet and withdrawn. Doesn't want to take a bath. No aggressive or destructive  behaviours.    Appetite is good. Drinks water well.     Daughter never got geodon prescribed for hallucinations. Insurance didn't approve? But not having hallucinations now anyway.     Past Medical History:   Diagnosis Date   • Chronic bilateral low back pain without sciatica 03/28/2017   • Dementia (CMS/HCC) 2011    MRI brain/carotic/EEG done   • Gastroesophageal reflux disease without esophagitis 1/4/2019   • Hyperlipidemia    • Tobacco use disorder 07/05/2011       Past Surgical History:   Procedure Laterality Date   • CHOLECYSTECTOMY  1974       Family History   Problem Relation Age of Onset   • Alzheimer's disease Mother    • Colon cancer Mother    • Skin cancer Brother    • Other Brother         amputation leg secondary to blood clot-details unknown   • Hypertension Daughter    • Diabetes Maternal Uncle    • Colon cancer Maternal Grandfather        Social History     Socioeconomic History   • Marital status:      Spouse name: Not on file   • Number of children: Not on file   • Years of education: Not on file   • Highest education " "level: Not on file   Tobacco Use   • Smoking status: Former Smoker     Packs/day: 1.50     Years: 56.00     Pack years: 84.00     Types: Cigarettes     Quit date: 2017     Years since quittin.4   • Smokeless tobacco: Never Used   • Tobacco comment: 4 cig a day    Substance and Sexual Activity   • Alcohol use: No   • Drug use: Defer   • Sexual activity: Defer       No Known Allergies    Review of Systems:     Review of Systems   Constitutional: Negative for chills, fatigue and fever.   HENT: Negative for congestion, ear pain and sinus pressure.    Respiratory: Negative for cough, chest tightness, shortness of breath and wheezing.    Cardiovascular: Negative for chest pain, palpitations and leg swelling.   Gastrointestinal: Negative for abdominal pain, blood in stool, constipation and diarrhea.   Genitourinary: Positive for urinary incontinence.   Musculoskeletal: Positive for gait problem. Negative for arthralgias and back pain.   Skin: Negative for color change.   Allergic/Immunologic: Negative for environmental allergies.   Neurological: Positive for memory problem. Negative for dizziness, speech difficulty and headache.   Hematological: Negative for adenopathy. Does not bruise/bleed easily.   Psychiatric/Behavioral: Positive for sleep disturbance. Negative for agitation, decreased concentration, hallucinations, self-injury and suicidal ideas. The patient is not nervous/anxious.        Vital Signs  Vitals:    21 1647   BP: 119/80   BP Location: Left arm   Patient Position: Sitting   Cuff Size: Adult   Pulse: 53   Temp: 97.7 °F (36.5 °C)   TempSrc: Temporal   Weight: 64.9 kg (143 lb)   Height: 165.4 cm (65.12\")   PainSc: 0-No pain     Body mass index is 23.71 kg/m².      Current Outpatient Medications:   •  Calcium Carb-Cholecalciferol (OS-OSCAR CALCIUM + D3) 500-200 MG-UNIT tablet, Take 1 tablet by mouth Daily., Disp: 60 tablet, Rfl: 11  •  Cholecalciferol (Vitamin D3) 125 MCG (5000 UT) tablet dispersible, " Place 1 tablet on the tongue Daily., Disp: 90 tablet, Rfl: 3  •  Cyanocobalamin (B-12) 1000 MCG tablet, Take 1 tablet by mouth Daily., Disp: 90 tablet, Rfl: 3  •  donepezil (ARICEPT) 5 MG tablet, TAKE 1 TABLET BY MOUTH EVERY DAY IN THE EVENING, Disp: 90 tablet, Rfl: 1  •  escitalopram (LEXAPRO) 20 MG tablet, Take 1 tablet by mouth Daily., Disp: 90 tablet, Rfl: 1  •  nystatin (MYCOSTATIN) 694314 UNIT/GM cream, apply by topical route 2 times every day to the affected area(s), Disp: , Rfl:   •  olopatadine (Patanol) 0.1 % ophthalmic solution, Administer 1 drop to both eyes 2 (Two) Times a Day As Needed for Allergies., Disp: 1 each, Rfl: 12  •  pravastatin (PRAVACHOL) 20 MG tablet, TAKE ONE TABLET BY MOUTH EVERY EVENING, Disp: 90 tablet, Rfl: 10  •  memantine (NAMENDA XR) 14 MG capsule sustained-release 24 hr extended release capsule, TAKE 1 CAPSULE BY MOUTH EVERY DAY, Disp: 90 capsule, Rfl: 1  •  MULTIPLE VITAMIN PO, Take 1 tablet by mouth Daily., Disp: , Rfl:     Physical Exam:    Physical Exam  Vitals and nursing note reviewed.   Constitutional:       Appearance: She is well-developed.   HENT:      Head: Normocephalic.   Eyes:      Conjunctiva/sclera: Conjunctivae normal.      Pupils: Pupils are equal, round, and reactive to light.   Neck:      Thyroid: No thyromegaly.   Cardiovascular:      Rate and Rhythm: Normal rate and regular rhythm.      Heart sounds: Normal heart sounds.   Pulmonary:      Effort: Pulmonary effort is normal.      Breath sounds: Normal breath sounds. No wheezing.   Musculoskeletal:         General: Normal range of motion.      Cervical back: Normal range of motion and neck supple.   Lymphadenopathy:      Cervical: No cervical adenopathy.   Skin:     General: Skin is warm and dry.   Neurological:      Mental Status: She is alert.      Cranial Nerves: Cranial nerves are intact.      Motor: Weakness present.      Gait: Gait abnormal.   Psychiatric:         Attention and Perception: Attention  "normal.         Mood and Affect: Affect normal.         Behavior: Behavior is not agitated or aggressive. Behavior is cooperative.         Thought Content: Thought content normal. Thought content is not paranoid or delusional. Thought content does not include homicidal or suicidal ideation.         Cognition and Memory: Cognition is impaired. Memory is impaired.      Comments: No \"picking\" behaviours noted during the visit today.  She was quiet and cooperative.           ACE III MINI        Results Review:    None    CMP:  Lab Results   Component Value Date    GLU 93 09/28/2020    BUN 14 09/28/2020    CREATININE 0.96 09/28/2020    EGFRIFNONA 57 (L) 09/28/2020    EGFRIFAFRI 66 09/28/2020    BCR 15 09/28/2020     09/28/2020    K 4.2 09/28/2020    CO2 21 09/28/2020    CALCIUM 9.5 09/28/2020    PROTENTOTREF 7.2 09/28/2020    ALBUMIN 4.0 09/28/2020    LABGLOBREF 3.2 09/28/2020    LABIL2 1.3 09/28/2020    BILITOT 0.4 09/28/2020    ALKPHOS 127 (H) 09/28/2020    AST 24 09/28/2020    ALT 13 09/28/2020     HbA1c:  No results found for: HGBA1C  Microalbumin:  No results found for: MICROALBUR, POCMALB, POCCREAT  Lipid Panel  Lab Results   Component Value Date    CHOL 164 01/25/2019    TRIG 172 (H) 01/25/2019    HDL 53 01/25/2019    LDL 77 01/25/2019    AST 24 09/28/2020    ALT 13 09/28/2020       Medication Review: Medications reviewed and noted  Patient Instructions   Problem List Items Addressed This Visit        Advance Directives and General Issues    Risk for falls    Overview     6/2/2021 Estephania Wolfe MD    She is at high risk for falls.  Discussed fall prevention with daughter.    Patient needs 24 hour care. Her daughter is no longer able to take care of her at home. She has to be gone at work all day long. No one to watch the patient during the day.            Cardiac and Vasculature    Mixed hyperlipidemia    Overview     6/2/2021 Estephania Wolfe MD    Continue pravastatin every evening.  Continue low-fat " diet.         Relevant Medications    pravastatin (PRAVACHOL) 20 MG tablet       Endocrine and Metabolic    Vitamin D deficiency (Chronic)    Overview     6/2/2021 Estephania Wolfe MD    Continue vitamin D3 daily.         B12 deficiency    Overview     6/2/2021 Estephania Wolfe MD    Continue B12 tablet daily.            Mental Health    Depression, major, single episode, moderate (CMS/HCC)    Overview     6/2/2021 Estephania Wolfe MD    Continue escitalopram (Lexapro) daily.         Relevant Medications    memantine (NAMENDA XR) 14 MG capsule sustained-release 24 hr extended release capsule    donepezil (ARICEPT) 5 MG tablet    escitalopram (LEXAPRO) 20 MG tablet       Neuro    Late onset Alzheimer's disease without behavioral disturbance (CMS/HCC) - Primary    Overview     6/2/2021 Estephania Wolfe MD    Continue donepezil  5 mg tablet  every evening.  Continue memantine daily.    Patient with severe dementia unable to perform her own ADLs.  It is medically necessary for the patient to be placed in a long-term care skilled nursing facility.  It is an unsafe situation with her being at home all day by herself since all of the family members work.          Relevant Medications    memantine (NAMENDA XR) 14 MG capsule sustained-release 24 hr extended release capsule    donepezil (ARICEPT) 5 MG tablet    escitalopram (LEXAPRO) 20 MG tablet       Symptoms and Signs    Gait disturbance    Overview     6/2/2021 Estephania Wolfe MD    Patient uses walker and wheelchair to get around.                  Diagnosis Plan   1. Late onset Alzheimer's disease without behavioral disturbance (CMS/HCC)     2. Depression, major, single episode, moderate (CMS/HCC)  escitalopram (LEXAPRO) 20 MG tablet   3. Risk for falls     4. Gait disturbance     5. Mixed hyperlipidemia     6. B12 deficiency     7. Vitamin D deficiency             Plan of care reviewed with patient at the conclusion of today's visit. Education was provided  regarding diagnosis, management, and any prescribed or recommended OTC medications.Patient verbalizes understanding of and agreement with management plan.         Estephania Wolfe MD

## 2021-06-02 NOTE — PATIENT INSTRUCTIONS
Patient Instructions   Problem List Items Addressed This Visit        Advance Directives and General Issues    Risk for falls    Overview     6/2/2021 Estephania Wolfe MD    She is at high risk for falls.  Discussed fall prevention with daughter.    Patient needs 24 hour care. Her daughter is no longer able to take care of her at home. She has to be gone at work all day long. No one to watch the patient during the day.            Cardiac and Vasculature    Mixed hyperlipidemia    Overview     6/2/2021 Estephania Wolfe MD    Continue pravastatin every evening.  Continue low-fat diet.         Relevant Medications    pravastatin (PRAVACHOL) 20 MG tablet       Endocrine and Metabolic    Vitamin D deficiency (Chronic)    Overview     6/2/2021 Estephania Wolfe MD    Continue vitamin D3 daily.         B12 deficiency    Overview     6/2/2021 Estephania Wolfe MD    Continue B12 tablet daily.            Mental Health    Depression, major, single episode, moderate (CMS/HCC)    Overview     6/2/2021 Estephania Wolfe MD    Continue escitalopram (Lexapro) daily.         Relevant Medications    memantine (NAMENDA XR) 14 MG capsule sustained-release 24 hr extended release capsule    donepezil (ARICEPT) 5 MG tablet    escitalopram (LEXAPRO) 20 MG tablet       Neuro    Late onset Alzheimer's disease without behavioral disturbance (CMS/HCC) - Primary    Overview     6/2/2021 Estephania Wolfe MD    Continue donepezil  5 mg tablet  every evening.  Continue memantine daily.    Patient with severe dementia unable to perform her own ADLs.  It is medically necessary for the patient to be placed in a long-term care skilled nursing facility.  It is an unsafe situation with her being at home all day by herself since all of the family members work.          Relevant Medications    memantine (NAMENDA XR) 14 MG capsule sustained-release 24 hr extended release capsule    donepezil (ARICEPT) 5 MG tablet    escitalopram (LEXAPRO) 20 MG tablet        Symptoms and Signs    Gait disturbance    Overview     6/2/2021 Estephania Wolfe MD    Patient uses walker and wheelchair to get around.

## 2021-06-10 ENCOUNTER — TELEPHONE (OUTPATIENT)
Dept: INTERNAL MEDICINE | Facility: CLINIC | Age: 81
End: 2021-06-10

## 2021-06-25 ENCOUNTER — TELEPHONE (OUTPATIENT)
Dept: INTERNAL MEDICINE | Facility: CLINIC | Age: 81
End: 2021-06-25

## 2021-06-25 NOTE — TELEPHONE ENCOUNTER
Sent office note from 6/2/21 to [phone]. Added a note that if additional information is needed to please let us know.

## 2021-06-25 NOTE — TELEPHONE ENCOUNTER
PATIENT'S DAUGHTER CANDACE CALLED TO REPORT THAT [facility] STATES THEY HAVE NOT RECEIVED ANY PAPERWORK FROM PATIENT'S PCP TO GET HER IN THE PROGRAM. CANDACE IS GOING TO CALL BACK WITH A FAX NUMBER TO MAKE SURE PAPERWORK IS BEING SENT TO CORRECT PLACE.

## 2021-06-25 NOTE — TELEPHONE ENCOUNTER
Caller: CANDACE STAN    Relationship: DAUGHTER    Best call back number: [phone]    What form or medical record are you requesting: ALL PAPERWORK FOR ASSISTED LIVING FACILITY     Who is requesting this form or medical record from you: [facility]     How would you like to receive the form or medical records (pick-up, mail, fax):   If fax, what is the fax number: [phone]  If mail, what is the address:   If pick-up, provide patient with address and location details    Timeframe paperwork needed: ASAP     Additional notes: SHE SPOKE WITH SOMEONE A SHORT TIME AGO AND WAS TOLD TO CALL BACK WITH FAX NUMBER TO FACILITY.      IS REQUESTING A CALL -[phone],  WHEN THIS PAPERWORK HAS BEEN FAXED

## 2021-07-16 ENCOUNTER — TELEPHONE (OUTPATIENT)
Dept: INTERNAL MEDICINE | Facility: CLINIC | Age: 81
End: 2021-07-16

## 2021-07-16 NOTE — TELEPHONE ENCOUNTER
HAILEY MCDANIEL WHO IS THE PATIENT'S DAUGHTER CALLED AND STATED THEY HAD TRIED TO GET PATIENT INTO [facility] BUT HAVE NOT GOTTEN ANY RESPONSE FROM THEM EVEN THOUGH RECORDS WERE FAXED TO THEM ON 6/25/21.    NOW SHE WANTS TO TRY TO GET HER INTO [facility] BUT NEEDS ORDERS FROM DR. SANTA AND THEY REQUIRE NOTES SENT TO THEM. THEIR FAX NUMBER [phone] ATTN: FAWAD    PLEASE CALL DAUGHTER BACK WITH ASSISTANCE -[phone]

## 2021-07-22 NOTE — TELEPHONE ENCOUNTER
PATIENT'S DAUGHTER CALLED REQUESTING A ASSISTED LIVING ORDER. SHE HAD PREVIOUSLY WANTED TO TAKE HER MOTHER TO [place] BUT NOW IS WANTING HER TO GO TO [facility].

## 2021-07-22 NOTE — TELEPHONE ENCOUNTER
FAXED ORDER AND OFFICE NOTE FROM 6/02/2021 TO [facility]  FAX # [phone] ON 7/22/2021

## 2021-08-03 NOTE — TELEPHONE ENCOUNTER
Please send note from 06/02/21 to [facility] and send it to [facility] see note below

## 2021-08-03 NOTE — TELEPHONE ENCOUNTER
PATIENTS DAUGHTER HAILEY CALLING TO FOLLOW UP ON PATIENT IN NURSING HOME.    PLEASE FAX INFORMATION TO:    ATTN:  [place] [address] Phone: [phone] Fax: [phone]        PLEASE CALL HAILEY @ [phone] TO FOLLOW UP    OK TO LEAVE VOICE MESSAGE DUE TO HAILEY WILL BE AT WORK.      SHE IS REACHABLE AT 12:15 DURING LUNCH TIME  BREAK TIME 10:45'CRYSTAL

## 2021-08-06 ENCOUNTER — TELEPHONE (OUTPATIENT)
Dept: INTERNAL MEDICINE | Facility: CLINIC | Age: 81
End: 2021-08-06

## 2021-08-06 ENCOUNTER — OFFICE VISIT (OUTPATIENT)
Dept: INTERNAL MEDICINE | Facility: CLINIC | Age: 81
End: 2021-08-06

## 2021-08-06 VITALS
SYSTOLIC BLOOD PRESSURE: 96 MMHG | TEMPERATURE: 98.2 F | WEIGHT: 133 LBS | BODY MASS INDEX: 22.16 KG/M2 | HEIGHT: 65 IN | HEART RATE: 76 BPM | DIASTOLIC BLOOD PRESSURE: 60 MMHG

## 2021-08-06 DIAGNOSIS — F02.80 LATE ONSET ALZHEIMER'S DISEASE WITHOUT BEHAVIORAL DISTURBANCE (HCC): Primary | ICD-10-CM

## 2021-08-06 DIAGNOSIS — R63.4 ABNORMAL WEIGHT LOSS: ICD-10-CM

## 2021-08-06 DIAGNOSIS — R44.3 HALLUCINATIONS: ICD-10-CM

## 2021-08-06 DIAGNOSIS — E78.2 MIXED HYPERLIPIDEMIA: ICD-10-CM

## 2021-08-06 DIAGNOSIS — I49.3 FREQUENT UNIFOCAL PVCS: ICD-10-CM

## 2021-08-06 DIAGNOSIS — E55.9 VITAMIN D DEFICIENCY: Chronic | ICD-10-CM

## 2021-08-06 DIAGNOSIS — G30.1 LATE ONSET ALZHEIMER'S DISEASE WITHOUT BEHAVIORAL DISTURBANCE (HCC): Primary | ICD-10-CM

## 2021-08-06 DIAGNOSIS — E53.8 B12 DEFICIENCY: ICD-10-CM

## 2021-08-06 DIAGNOSIS — F32.1 DEPRESSION, MAJOR, SINGLE EPISODE, MODERATE (HCC): ICD-10-CM

## 2021-08-06 PROCEDURE — 99214 OFFICE O/P EST MOD 30 MIN: CPT | Performed by: INTERNAL MEDICINE

## 2021-08-06 RX ORDER — QUETIAPINE FUMARATE 25 MG/1
25 TABLET, FILM COATED ORAL NIGHTLY
Qty: 30 TABLET | Refills: 5 | Status: SHIPPED | OUTPATIENT
Start: 2021-08-06 | End: 2022-10-27

## 2021-08-06 RX ORDER — DONEPEZIL HYDROCHLORIDE 5 MG/1
5 TABLET, FILM COATED ORAL EVERY EVENING
Qty: 90 TABLET | Refills: 1 | Status: SHIPPED | OUTPATIENT
Start: 2021-08-06 | End: 2022-10-31 | Stop reason: SDUPTHER

## 2021-08-06 RX ORDER — PRAVASTATIN SODIUM 20 MG
20 TABLET ORAL EVERY EVENING
Qty: 90 TABLET | Refills: 3 | Status: SHIPPED | OUTPATIENT
Start: 2021-08-06

## 2021-08-06 RX ORDER — MEMANTINE HYDROCHLORIDE 14 MG/1
14 CAPSULE, EXTENDED RELEASE ORAL DAILY
Qty: 90 CAPSULE | Refills: 1 | Status: SHIPPED | OUTPATIENT
Start: 2021-08-06 | End: 2022-10-31 | Stop reason: SDUPTHER

## 2021-08-06 NOTE — TELEPHONE ENCOUNTER
----- Message from Estephania Wolfe MD sent at 8/6/2021 11:04 AM EDT -----  Please call Scarlet Graf at [facility] to see what they need us to send over an order to get the patient considered for long-term care at their facility

## 2021-08-06 NOTE — PROGRESS NOTES
[facility]     Jia Bright  1940   [id number]      Patient Care Team:  Estephania Wolfe MD as PCP - General (Internal Medicine)    Chief Complaint   Patient presents with   • nursing home referral     follow up.  Patient is still at home   • Hallucinations     daughter says this is occurring today            HPI  Patient is a 80 y.o. female presents with trying to get long term care placement.Hoping to get her into [facility], [place]. 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     CHRONIC CONDITIONS  Dementia-memory loss, hallucinations on and off,forgets to eat, losing weight, needs help with dressing and bathing, picking at clothes/diaper, unsafe at home alone.  Daughter is her caretaker.  Her daughter works and is unable to afford to pay a  sitter while she is not at home.  Patient is incontinent and wears depends.     Taking memantine and donepizil for memory. Taking escitalopram for depression.  Escitalopram does seem to be helping her mood.  Patient is very pleasant and cooperative. She tells me today that she worked many years and now is tired.  She compliments my dress.     Taking pravastatin for hyperlipidemia.    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     Past Medical History:   Diagnosis Date   • Chronic bilateral low back pain without sciatica 03/28/2017   • Dementia (CMS/HCC) 2011    MRI brain/carotic/EEG done   • Gastroesophageal reflux disease without esophagitis 1/4/2019   • Hyperlipidemia    • Tobacco use disorder 07/05/2011       Past Surgical History:   Procedure Laterality Date   • CHOLECYSTECTOMY  1974       Family History   Problem Relation Age of Onset   • Alzheimer's disease Mother    • Colon cancer Mother    • Skin cancer Brother    • Other Brother         amputation leg secondary to blood clot-details unknown   • Hypertension Daughter    • Diabetes Maternal Uncle    • Colon cancer Maternal Grandfather        Social History     Socioeconomic History   • Marital  "status:      Spouse name: Not on file   • Number of children: Not on file   • Years of education: Not on file   • Highest education level: Not on file   Tobacco Use   • Smoking status: Former Smoker     Packs/day: 1.50     Years: 56.00     Pack years: 84.00     Types: Cigarettes     Quit date:      Years since quittin.5   • Smokeless tobacco: Never Used   • Tobacco comment: 4 cig a day    Substance and Sexual Activity   • Alcohol use: No   • Drug use: Defer   • Sexual activity: Defer       No Known Allergies    Review of Systems:     Review of Systems   Constitutional: Positive for unexpected weight loss. Negative for chills, fatigue and fever.   HENT: Negative for congestion, ear pain and sinus pressure.    Respiratory: Negative for cough, chest tightness, shortness of breath and wheezing.    Cardiovascular: Negative for chest pain, palpitations and leg swelling.   Gastrointestinal: Negative for abdominal pain, blood in stool and constipation.   Musculoskeletal: Positive for gait problem.   Skin: Negative for color change.   Allergic/Immunologic: Negative for environmental allergies.   Neurological: Positive for memory problem. Negative for dizziness, speech difficulty and headache.   Psychiatric/Behavioral: Negative for depressed mood. The patient is not nervous/anxious.        Vital Signs  Vitals:    21 0959   BP: 96/60   BP Location: Left arm   Patient Position: Sitting   Cuff Size: Adult   Pulse: 76  Comment: irregular   Temp: 98.2 °F (36.8 °C)   Weight: 60.3 kg (133 lb)   Height: 165.1 cm (65\")   PainSc: 0-No pain     Body mass index is 22.13 kg/m².      Current Outpatient Medications:   •  Calcium Carb-Cholecalciferol (OS-OSCAR CALCIUM + D3) 500-200 MG-UNIT tablet, Take 1 tablet by mouth Daily., Disp: 60 tablet, Rfl: 11  •  Cholecalciferol (Vitamin D3) 125 MCG (5000 UT) tablet dispersible, Place 1 tablet on the tongue Daily., Disp: 90 tablet, Rfl: 3  •  Cyanocobalamin (B-12) 1000 MCG " tablet, Take 1 tablet by mouth Daily., Disp: 90 tablet, Rfl: 3  •  donepezil (ARICEPT) 5 MG tablet, Take 1 tablet by mouth Every Evening., Disp: 90 tablet, Rfl: 1  •  escitalopram (LEXAPRO) 20 MG tablet, Take 1 tablet by mouth Daily., Disp: 90 tablet, Rfl: 1  •  memantine (NAMENDA XR) 14 MG capsule sustained-release 24 hr extended release capsule, Take 1 capsule by mouth Daily., Disp: 90 capsule, Rfl: 1  •  MULTIPLE VITAMIN PO, Take 1 tablet by mouth Daily., Disp: , Rfl:   •  nystatin (MYCOSTATIN) 139313 UNIT/GM cream, apply by topical route 2 times every day to the affected area(s), Disp: , Rfl:   •  olopatadine (Patanol) 0.1 % ophthalmic solution, Administer 1 drop to both eyes 2 (Two) Times a Day As Needed for Allergies., Disp: 1 each, Rfl: 12  •  pravastatin (PRAVACHOL) 20 MG tablet, Take 1 tablet by mouth Every Evening., Disp: 90 tablet, Rfl: 3  •  QUEtiapine (SEROquel) 25 MG tablet, Take 1 tablet by mouth Every Night., Disp: 30 tablet, Rfl: 5    Physical Exam:    Physical Exam  Vitals and nursing note reviewed.   Constitutional:       General: She is not in acute distress.     Appearance: She is well-developed.   HENT:      Head: Normocephalic.   Eyes:      Conjunctiva/sclera: Conjunctivae normal.      Pupils: Pupils are equal, round, and reactive to light.   Neck:      Thyroid: No thyromegaly.   Cardiovascular:      Rate and Rhythm: Normal rate and regular rhythm. Frequent extrasystoles are present.     Heart sounds: Normal heart sounds.   Pulmonary:      Effort: Pulmonary effort is normal.      Breath sounds: Normal breath sounds. No wheezing.   Musculoskeletal:         General: Normal range of motion.      Cervical back: Normal range of motion and neck supple.      Right lower leg: No edema.      Left lower leg: No edema.   Lymphadenopathy:      Cervical: No cervical adenopathy.   Skin:     General: Skin is warm and dry.   Neurological:      Mental Status: She is alert and oriented to person, place, and  time.   Psychiatric:         Attention and Perception: Attention normal.         Mood and Affect: Mood normal.         Behavior: Behavior is cooperative.         Cognition and Memory: Cognition is impaired. Memory is impaired.          ACE III MINI        Results Review:    None    CMP:  Lab Results   Component Value Date    GLU 93 09/28/2020    BUN 14 09/28/2020    CREATININE 0.96 09/28/2020    EGFRIFNONA 57 (L) 09/28/2020    EGFRIFAFRI 66 09/28/2020    BCR 15 09/28/2020     09/28/2020    K 4.2 09/28/2020    CO2 21 09/28/2020    CALCIUM 9.5 09/28/2020    PROTENTOTREF 7.2 09/28/2020    ALBUMIN 4.0 09/28/2020    LABGLOBREF 3.2 09/28/2020    LABIL2 1.3 09/28/2020    BILITOT 0.4 09/28/2020    ALKPHOS 127 (H) 09/28/2020    AST 24 09/28/2020    ALT 13 09/28/2020     HbA1c:  No results found for: HGBA1C  Microalbumin:  No results found for: MICROALBUR, POCMALB, POCCREAT  Lipid Panel  Lab Results   Component Value Date    CHOL 164 01/25/2019    TRIG 172 (H) 01/25/2019    HDL 53 01/25/2019    LDL 77 01/25/2019    AST 24 09/28/2020    ALT 13 09/28/2020       Medication Review: Medications reviewed and noted  Patient Instructions   Problem List Items Addressed This Visit        Cardiac and Vasculature    Mixed hyperlipidemia    Overview     8/6/2021 Estephania Wolfe MD    Continue pravastatin every evening.  Continue low-fat diet.         Relevant Medications    pravastatin (PRAVACHOL) 20 MG tablet    Other Relevant Orders    CBC & Differential    Comprehensive Metabolic Panel    Lipid Panel    TSH    Frequent unifocal PVCs    Overview     8/6/2021 Estephania Wolfe MD    Patient is asymptomatic.  Continue to monitor.            Endocrine and Metabolic    Vitamin D deficiency (Chronic)    Overview     8/6/2021 Estephania Wolfe MD    Continue vitamin D3 daily.         Relevant Orders    Vitamin D 25 Hydroxy    B12 deficiency    Overview     8/6/2021 Estephania Wolfe MD    Continue B12 tablet daily.         Relevant  Orders    Vitamin B12    Abnormal weight loss    Overview     8/6/2021 Estephania Wolfe MD    Abnormal weight loss due to dementia and decrease in appetite and forgetting to eat unless it is set right in front of her.            Mental Health    Depression, major, single episode, moderate (CMS/HCC)    Overview     8/6/2021 Estephania Wolfe MD    Continue escitalopram (Lexapro) daily.         Relevant Medications    escitalopram (LEXAPRO) 20 MG tablet    QUEtiapine (SEROquel) 25 MG tablet    memantine (NAMENDA XR) 14 MG capsule sustained-release 24 hr extended release capsule    donepezil (ARICEPT) 5 MG tablet       Neuro    Late onset Alzheimer's disease without behavioral disturbance (CMS/HCC) - Primary    Overview     8/6/2021 Estephania Wolfe MD    Continue donepezil  and memantine every evening.    Add quetiapine every evening to see if it improves hallucinations and picking habit.  Patient with severe dementia unable to perform her own ADLs.  It is medically necessary for the patient to be placed in a long-term care skilled nursing facility.  It is an unsafe situation with her being at home all day by herself since all of the family members work.          Relevant Medications    escitalopram (LEXAPRO) 20 MG tablet    QUEtiapine (SEROquel) 25 MG tablet    memantine (NAMENDA XR) 14 MG capsule sustained-release 24 hr extended release capsule    donepezil (ARICEPT) 5 MG tablet       Symptoms and Signs    Hallucinations    Overview     8/6/2021 Estephania Wolfe MD     Take quetiapine every evening with dinner to help with hallucinations and behavioral problems related to dementia.                  Diagnosis Plan   1. Late onset Alzheimer's disease without behavioral disturbance (CMS/HCC)     2. Hallucinations     3. Depression, major, single episode, moderate (CMS/HCC)     4. Mixed hyperlipidemia  CBC & Differential    Comprehensive Metabolic Panel    Lipid Panel    TSH    TSH    Lipid Panel    Comprehensive  Metabolic Panel    CBC & Differential   5. Frequent unifocal PVCs     6. Vitamin D deficiency  Vitamin D 25 Hydroxy    Vitamin D 25 Hydroxy   7. B12 deficiency  Vitamin B12    Vitamin B12   8. Abnormal weight loss             Plan of care reviewed with patient at the conclusion of today's visit. Education was provided regarding diagnosis, management, and any prescribed or recommended OTC medications.Patient verbalizes understanding of and agreement with management plan.         Estephania Wolfe MD

## 2021-08-06 NOTE — PATIENT INSTRUCTIONS
Patient Instructions   Problem List Items Addressed This Visit        Cardiac and Vasculature    Mixed hyperlipidemia    Overview     8/6/2021 Estephania Wolfe MD    Continue pravastatin every evening.  Continue low-fat diet.         Relevant Medications    pravastatin (PRAVACHOL) 20 MG tablet    Other Relevant Orders    CBC & Differential    Comprehensive Metabolic Panel    Lipid Panel    TSH    Frequent unifocal PVCs    Overview     8/6/2021 Estephania Wolfe MD    Patient is asymptomatic.  Continue to monitor.            Endocrine and Metabolic    Vitamin D deficiency (Chronic)    Overview     8/6/2021 Estephania Wolfe MD    Continue vitamin D3 daily.         Relevant Orders    Vitamin D 25 Hydroxy    B12 deficiency    Overview     8/6/2021 Estephania Wolfe MD    Continue B12 tablet daily.         Relevant Orders    Vitamin B12    Abnormal weight loss    Overview     8/6/2021 Estephania Wolfe MD    Abnormal weight loss due to dementia and decrease in appetite and forgetting to eat unless it is set right in front of her.            Mental Health    Depression, major, single episode, moderate (CMS/HCC)    Overview     8/6/2021 Estephania Wolfe MD    Continue escitalopram (Lexapro) daily.         Relevant Medications    escitalopram (LEXAPRO) 20 MG tablet    QUEtiapine (SEROquel) 25 MG tablet    memantine (NAMENDA XR) 14 MG capsule sustained-release 24 hr extended release capsule    donepezil (ARICEPT) 5 MG tablet       Neuro    Late onset Alzheimer's disease without behavioral disturbance (CMS/HCC) - Primary    Overview     8/6/2021 Estephania Wolfe MD    Continue donepezil  and memantine every evening.    Add quetiapine every evening to see if it improves hallucinations and picking habit.  Patient with severe dementia unable to perform her own ADLs.  It is medically necessary for the patient to be placed in a long-term care skilled nursing facility.  It is an unsafe situation with her being at home all day  "by herself since all of the family members work.          Relevant Medications    escitalopram (LEXAPRO) 20 MG tablet    QUEtiapine (SEROquel) 25 MG tablet    memantine (NAMENDA XR) 14 MG capsule sustained-release 24 hr extended release capsule    donepezil (ARICEPT) 5 MG tablet       Symptoms and Signs    Hallucinations    Overview     8/6/2021 Estephania Wolfe MD     Take quetiapine every evening with dinner to help with hallucinations and behavioral problems related to dementia.                  https://point-of-care.[employer]MING[employer]AY/skills\"> Evidence-based counseling and psychotherapy for an aging population (1st ed., pp. 323-339). [place], [place]: [employer].\">   Dementia Caregiver Guide  Dementia is a term used to describe a number of symptoms that affect memory and thinking. The most common symptoms include:  · Memory loss.  · Trouble with language and communication.  · Trouble concentrating.  · Poor judgment.  · Problems with reasoning.  · Child-like behavior and language.  · Extreme anxiety or depression.  · Angry outbursts and accusations.  · Wandering from home or public places.  · Being suspicious.  Dementia usually gets worse slowly over time. In the early stages, people with dementia can stay independent and safe with some help. In later stages, they need help with daily tasks such as dressing, grooming, and using the bathroom.  How to care for a person with dementia  Dementia can be frightening and confusing. Here are some tips to help the person with dementia manage changes caused by the disease.  How to communicate  · When the person is talking or seems frustrated, make eye contact and hold the person's hand.  · Ask specific questions that need yes or no answers.  · Use simple words, short sentences, and a calm voice. Only give one direction at a time.  · When offering choices, limit the person to just one or two.  · Avoid correcting the person in a negative way.  · If the person is " struggling to find the right words, gently try to help him or her.  How to prevent injury    · Keep floors clear of clutter. Remove rugs, magazine racks, and floor lamps.  · Keep hallways well lit, especially at night.  · Put a handrail and nonslip mat in the bathtub or shower.  · Put childproof locks on cabinets that contain dangerous items, such as medicines, alcohol, guns, toxic cleaning items, sharp tools or utensils, matches, and lighters.  · Put the locks in places where the person cannot see or reach them easily. This will help ensure that the person does not wander out of the house and get lost.  · Be prepared for emergencies. Keep a list of emergency phone numbers and addresses in a convenient area.  · Remove car keys and lock garage doors so that the person does not try to get in the car and drive.  · Have the person wear a bracelet that tracks locations and identifies the person as having memory problems. This should be worn at all times for safety.  How to help with daily life    · Keep the person on track with his or her routine.  · Try to identify areas where the person may need help.  · Be supportive, patient, calm, and encouraging.  · Gently remind the person that adjusting to changes takes time.  · Help with the tasks that the person has asked for help with.  · Keep the person involved in daily tasks and decisions as much as possible.  · Encourage conversation, but try not to get frustrated or harried if the person struggles to find words or does not seem to appreciate your help.  Follow these instructions at home:  Take care of your health  Make sure that you and the person you are caring for:  · Get regular sleep.  · Exercise regularly.  · Eat regular, nutritious meals.  · Take over-the-counter and prescription medicines only as told by your health care providers.  · Drink enough fluid to keep your urine pale yellow.  · Attend all scheduled health care appointments.    Watch for signs of  stress  Look for signs of stress in yourself and in the person you are caring for. If you notice signs of stress, take steps to manage it. Symptoms of stress include:  · Feeling frustrated or angry.  · Denying that the person has dementia or that his or her symptoms will not improve.  · Feeling hopeless and unappreciated.  · Difficulty sleeping.  · Difficulty concentrating.  · Feeling anxious, irritable, or depressed.  · Developing stress-related health problems.  · Feeling like you have too little time for your own life.  General instructions  · Join a support group with others who are caregivers.  · Ask about respite care resources so that you can have a regular break from the stress of caregiving.  · Consider any safety risks and take steps to avoid them.  · Organize medicines in a pill box for each day of the week.  · Create a plan to handle any legal or financial matters. Get legal or financial advice if needed.  · Keep a calendar in a central location to remind the person of appointments or other activities.  Where to find support:  Many individuals and organizations offer support. These include:  · Support groups for people with dementia.  · Support groups for caregivers.  · Counselors or therapists.  · Home health care services.  · Adult day care centers.  Where to find more information  · Centers for Disease Control and Prevention: www.cdc.gov  · Alzheimer's Association: www.alz.org  · Family Caregiver [place]: www.caregiver.org  · Alzheimer's Foundation of [place]: www.alzfdn.org  Contact a health care provider if:  · The person's health is rapidly getting worse.  · You are no longer able to care for the person.  · Caring for the person is affecting your physical and emotional health.  · The person threatens himself or herself, you, or anyone else.  Summary  · Dementia is a term used to describe a number of symptoms that affect memory and thinking.  · Dementia usually gets worse slowly over time.  · Take  steps to reduce the person's risk of injury and to plan for future care.  · Caregivers need support, relief from caregiving, and time for their own lives.  This information is not intended to replace advice given to you by your health care provider. Make sure you discuss any questions you have with your health care provider.  Document Revised: 11/17/2020 Document Reviewed: 11/17/2020  Elsevier Patient Education © 2021 [employer]

## 2021-08-07 LAB
25(OH)D3+25(OH)D2 SERPL-MCNC: 32 NG/ML (ref 30–100)
ALBUMIN SERPL-MCNC: 4.1 G/DL (ref 3.5–5.2)
ALBUMIN/GLOB SERPL: 1.3 G/DL
ALP SERPL-CCNC: 124 U/L (ref 39–117)
ALT SERPL-CCNC: 11 U/L (ref 1–33)
AST SERPL-CCNC: 22 U/L (ref 1–32)
BASOPHILS # BLD AUTO: 0.04 10*3/MM3 (ref 0–0.2)
BASOPHILS NFR BLD AUTO: 0.4 % (ref 0–1.5)
BILIRUB SERPL-MCNC: 0.5 MG/DL (ref 0–1.2)
BUN SERPL-MCNC: 13 MG/DL (ref 8–23)
BUN/CREAT SERPL: 14.4 (ref 7–25)
CALCIUM SERPL-MCNC: 10.1 MG/DL (ref 8.6–10.5)
CHLORIDE SERPL-SCNC: 101 MMOL/L (ref 98–107)
CHOLEST SERPL-MCNC: 211 MG/DL (ref 0–200)
CO2 SERPL-SCNC: 24.2 MMOL/L (ref 22–29)
CREAT SERPL-MCNC: 0.9 MG/DL (ref 0.57–1)
EOSINOPHIL # BLD AUTO: 0.08 10*3/MM3 (ref 0–0.4)
EOSINOPHIL NFR BLD AUTO: 0.8 % (ref 0.3–6.2)
ERYTHROCYTE [DISTWIDTH] IN BLOOD BY AUTOMATED COUNT: 13.1 % (ref 12.3–15.4)
GLOBULIN SER CALC-MCNC: 3.2 GM/DL
GLUCOSE SERPL-MCNC: 87 MG/DL (ref 65–99)
HCT VFR BLD AUTO: 46.6 % (ref 34–46.6)
HDLC SERPL-MCNC: 61 MG/DL (ref 40–60)
HGB BLD-MCNC: 15.4 G/DL (ref 12–15.9)
IMM GRANULOCYTES # BLD AUTO: 0.04 10*3/MM3 (ref 0–0.05)
IMM GRANULOCYTES NFR BLD AUTO: 0.4 % (ref 0–0.5)
LDLC SERPL CALC-MCNC: 123 MG/DL (ref 0–100)
LYMPHOCYTES # BLD AUTO: 1.12 10*3/MM3 (ref 0.7–3.1)
LYMPHOCYTES NFR BLD AUTO: 11 % (ref 19.6–45.3)
MCH RBC QN AUTO: 30.9 PG (ref 26.6–33)
MCHC RBC AUTO-ENTMCNC: 33 G/DL (ref 31.5–35.7)
MCV RBC AUTO: 93.6 FL (ref 79–97)
MONOCYTES # BLD AUTO: 0.71 10*3/MM3 (ref 0.1–0.9)
MONOCYTES NFR BLD AUTO: 7 % (ref 5–12)
NEUTROPHILS # BLD AUTO: 8.18 10*3/MM3 (ref 1.7–7)
NEUTROPHILS NFR BLD AUTO: 80.4 % (ref 42.7–76)
NRBC BLD AUTO-RTO: 0 /100 WBC (ref 0–0.2)
PLATELET # BLD AUTO: 353 10*3/MM3 (ref 140–450)
POTASSIUM SERPL-SCNC: 4 MMOL/L (ref 3.5–5.2)
PROT SERPL-MCNC: 7.3 G/DL (ref 6–8.5)
RBC # BLD AUTO: 4.98 10*6/MM3 (ref 3.77–5.28)
SODIUM SERPL-SCNC: 138 MMOL/L (ref 136–145)
TRIGL SERPL-MCNC: 154 MG/DL (ref 0–150)
TSH SERPL DL<=0.005 MIU/L-ACNC: 4.76 UIU/ML (ref 0.27–4.2)
VIT B12 SERPL-MCNC: 505 PG/ML (ref 211–946)
VLDLC SERPL CALC-MCNC: 27 MG/DL (ref 5–40)
WBC # BLD AUTO: 10.17 10*3/MM3 (ref 3.4–10.8)

## 2021-08-09 NOTE — TELEPHONE ENCOUNTER
Scarlet states she is needing updated history and physical, med list, and diagnosis list. She also states family has been told they do not have a locked down memory unit and wants to know in your opinion if pt is a flight risk.

## 2021-08-09 NOTE — TELEPHONE ENCOUNTER
No patient is not a flight risk. She does not wander. Please send my note from last week and the med list Xena Pulliam

## 2021-08-09 NOTE — TELEPHONE ENCOUNTER
Scarlet called back, she needs full history & physical, med list, diagnosis & progress notes sent to her secure fax at [phone]

## 2022-10-27 ENCOUNTER — OFFICE VISIT (OUTPATIENT)
Dept: INTERNAL MEDICINE | Facility: CLINIC | Age: 82
End: 2022-10-27

## 2022-10-27 ENCOUNTER — LAB (OUTPATIENT)
Dept: LAB | Facility: HOSPITAL | Age: 82
End: 2022-10-27

## 2022-10-27 VITALS
BODY MASS INDEX: 24.14 KG/M2 | OXYGEN SATURATION: 98 % | DIASTOLIC BLOOD PRESSURE: 68 MMHG | WEIGHT: 131.2 LBS | HEIGHT: 62 IN | TEMPERATURE: 96.4 F | SYSTOLIC BLOOD PRESSURE: 120 MMHG | HEART RATE: 85 BPM

## 2022-10-27 DIAGNOSIS — G30.9 ALZHEIMER'S DEMENTIA WITH BEHAVIORAL DISTURBANCE: Chronic | ICD-10-CM

## 2022-10-27 DIAGNOSIS — N39.498 OTHER URINARY INCONTINENCE: ICD-10-CM

## 2022-10-27 DIAGNOSIS — E78.2 MIXED HYPERLIPIDEMIA: ICD-10-CM

## 2022-10-27 DIAGNOSIS — R26.9 GAIT DISTURBANCE: ICD-10-CM

## 2022-10-27 DIAGNOSIS — E55.9 VITAMIN D DEFICIENCY: Chronic | ICD-10-CM

## 2022-10-27 DIAGNOSIS — Z91.81 RISK FOR FALLS: ICD-10-CM

## 2022-10-27 DIAGNOSIS — R29.6 FREQUENT FALLS: ICD-10-CM

## 2022-10-27 DIAGNOSIS — F02.818 ALZHEIMER'S DEMENTIA WITH BEHAVIORAL DISTURBANCE: Chronic | ICD-10-CM

## 2022-10-27 DIAGNOSIS — E53.8 B12 DEFICIENCY: ICD-10-CM

## 2022-10-27 DIAGNOSIS — R44.3 HALLUCINATIONS: ICD-10-CM

## 2022-10-27 DIAGNOSIS — Z00.00 ANNUAL PHYSICAL EXAM: ICD-10-CM

## 2022-10-27 DIAGNOSIS — Z74.2: Chronic | ICD-10-CM

## 2022-10-27 DIAGNOSIS — Z00.00 MEDICARE ANNUAL WELLNESS VISIT, INITIAL: Primary | ICD-10-CM

## 2022-10-27 DIAGNOSIS — L22 DIAPER RASH: ICD-10-CM

## 2022-10-27 DIAGNOSIS — F32.1 DEPRESSION, MAJOR, SINGLE EPISODE, MODERATE: ICD-10-CM

## 2022-10-27 PROBLEM — R32 ABSENCE OF BLADDER CONTINENCE: Status: ACTIVE | Noted: 2022-10-27

## 2022-10-27 PROCEDURE — 1159F MED LIST DOCD IN RCRD: CPT | Performed by: INTERNAL MEDICINE

## 2022-10-27 PROCEDURE — 96160 PT-FOCUSED HLTH RISK ASSMT: CPT | Performed by: INTERNAL MEDICINE

## 2022-10-27 PROCEDURE — G0439 PPPS, SUBSEQ VISIT: HCPCS | Performed by: INTERNAL MEDICINE

## 2022-10-27 PROCEDURE — 1170F FXNL STATUS ASSESSED: CPT | Performed by: INTERNAL MEDICINE

## 2022-10-27 PROCEDURE — 99397 PER PM REEVAL EST PAT 65+ YR: CPT | Performed by: INTERNAL MEDICINE

## 2022-10-27 RX ORDER — OXYBUTYNIN CHLORIDE 10 MG/1
10 TABLET, EXTENDED RELEASE ORAL DAILY
Qty: 30 TABLET | Refills: 5 | Status: SHIPPED | OUTPATIENT
Start: 2022-10-27

## 2022-10-27 NOTE — PROGRESS NOTES
Preventative Annual Visit    Jia Bright  1940   [id number]    Patient Care Team:  Estephania Wolfe MD as PCP - General (Internal Medicine)    Chief Complaint::   Chief Complaint   Patient presents with   • Medicare Wellness-subsequent   • Hyperlipidemia        Subjective   History of Present Illness    Jia Bright is a 81 y.o. female who presents for an Annual Wellness Visit.    CHRONIC CONDITIONS    Patient Active Problem List   Diagnosis   • Alzheimer's dementia with behavioral disturbance ([facility])   • Risk for falls   • Gait disturbance   • Mixed hyperlipidemia   • Depression, major, single episode, moderate ([facility])   • B12 deficiency   • Shortness of breath on exertion   • Menopausal and perimenopausal disorder   • Visit for screening mammogram   • Frequent unifocal PVCs   • Frequent falls   • Hallucinations   • Vitamin D deficiency   • Medicare annual wellness visit, initial   • Annual physical exam   • Abnormal weight loss   • No assistance at home   • Absence of bladder continence        Past Medical History:   Diagnosis Date   • Chronic bilateral low back pain without sciatica 2017   • Dementia ([facility])     MRI brain/carotic/EEG done   • Gastroesophageal reflux disease without esophagitis 2019   • Hyperlipidemia    • Tobacco use disorder 2011       Past Surgical History:   Procedure Laterality Date   • CHOLECYSTECTOMY         Family History   Problem Relation Age of Onset   • Alzheimer's disease Mother    • Colon cancer Mother    • Skin cancer Brother    • Other Brother         amputation leg secondary to blood clot-details unknown   • Hypertension Daughter    • Diabetes Maternal Uncle    • Colon cancer Maternal Grandfather        Social History     Socioeconomic History   • Marital status:    Tobacco Use   • Smoking status: Former     Packs/day: 1.50     Years: 56.00     Pack years: 84.00     Types: Cigarettes     Quit date:      Years since quittin.8   •  Smokeless tobacco: Never   • Tobacco comments:     4 cig a day    Substance and Sexual Activity   • Alcohol use: No   • Drug use: Defer   • Sexual activity: Defer       No Known Allergies      Current Outpatient Medications:   •  Calcium Carb-Cholecalciferol (OS-OSCAR CALCIUM + D3) 500-200 MG-UNIT tablet, Take 1 tablet by mouth Daily., Disp: 60 tablet, Rfl: 11  •  Cholecalciferol (Vitamin D3) 125 MCG (5000 UT) tablet dispersible, Place 1 tablet on the tongue Daily., Disp: 90 tablet, Rfl: 3  •  Cyanocobalamin (B-12) 1000 MCG tablet, Take 1 tablet by mouth Daily., Disp: 90 tablet, Rfl: 3  •  donepezil (ARICEPT) 5 MG tablet, Take 1 tablet by mouth Every Evening., Disp: 90 tablet, Rfl: 1  •  memantine (NAMENDA XR) 14 MG capsule sustained-release 24 hr extended release capsule, Take 1 capsule by mouth Daily., Disp: 90 capsule, Rfl: 1  •  MULTIPLE VITAMIN PO, Take 1 tablet by mouth Daily., Disp: , Rfl:   •  nystatin (MYCOSTATIN) 392372 UNIT/GM cream, apply by topical route 2 times every day to the affected area(s), Disp: , Rfl:   •  olopatadine (Patanol) 0.1 % ophthalmic solution, Administer 1 drop to both eyes 2 (Two) Times a Day As Needed for Allergies., Disp: 1 each, Rfl: 12  •  pravastatin (PRAVACHOL) 20 MG tablet, Take 1 tablet by mouth Every Evening., Disp: 90 tablet, Rfl: 3  •  oxybutynin XL (DITROPAN-XL) 10 MG 24 hr tablet, Take 1 tablet by mouth Daily., Disp: 30 tablet, Rfl: 5    Immunization History   Administered Date(s) Administered   • COVID-19 (MODERNA) 1st, 2nd, 3rd Dose Only 06/13/2021, 07/11/2021   • FLUAD TRI 65YR+ 01/10/2020   • Fluad Quad 65+ 09/28/2020   • Fluzone High Dose =>65 Years ([employer] ONLY) 02/21/2018, 10/26/2018   • Hepatitis A 10/26/2018   • Influenza TIV (IM) 11/14/2011   • Pneumococcal Conjugate 13-Valent (PCV13) 06/26/2017   • Pneumococcal Polysaccharide (PPSV23) 01/10/2020        Health Maintenance Due   Topic Date Due   • TDAP/TD VACCINES (1 - Tdap) Never done   • COVID-19 Vaccine (3 -  "Booster for Moderna series) 09/05/2021   • ANNUAL WELLNESS VISIT  11/09/2021   • INFLUENZA VACCINE  08/01/2022   • LIPID PANEL  08/06/2022        Review of Systems     Vital Signs  Vitals:    10/27/22 1207   BP: 120/68   BP Location: Left arm   Patient Position: Sitting   Cuff Size: Adult   Pulse: 85   Temp: 96.4 °F (35.8 °C)   TempSrc: Infrared   SpO2: 98%   Weight: 59.5 kg (131 lb 3.2 oz)   Height: 158 cm (62.21\")   PainSc: 0-No pain     BMI is within normal parameters. No other follow-up for BMI required.    Physical Exam     Procedures     Fall Risk Screen:  EMMANUEL Fall Risk Assessment was completed, and patient is at HIGH risk for falls. Assessment completed on:10/27/2022    Health Habits and Functional and Cognitive Screening:  Functional & Cognitive Status 10/27/2022   Do you have difficulty preparing food and eating? Yes   Do you have difficulty bathing yourself, getting dressed or grooming yourself? Yes   Do you have difficulty using the toilet? Yes   Do you have difficulty moving around from place to place? Yes   Do you have trouble with steps or getting out of a bed or a chair? Yes   Current Diet Well Balanced Diet   Dental Exam Not up to date        Dental Exam Comment -   Eye Exam Not up to date   Exercise (times per week) 0 times per week   Current Exercises Include No Regular Exercise   Current Exercise Activities Include -   Do you need help using the phone?  Yes   Are you deaf or do you have serious difficulty hearing?  No   Do you need help with transportation? Yes   Do you need help shopping? Yes   Do you need help preparing meals?  Yes   Do you need help with housework?  Yes   Do you need help with laundry? Yes   Do you need help taking your medications? Yes   Do you need help managing money? Yes   Do you ever drive or ride in a car without wearing a seat belt? No   Have you felt unusual stress, anger or loneliness in the last month? Yes   Who do you live with? Child   If you need help, do you " "have trouble finding someone available to you? Yes   Have you been bothered in the last four weeks by sexual problems? No   Do you have difficulty concentrating, remembering or making decisions? Yes       Smoking Status:  Social History     Tobacco Use   Smoking Status Former   • Packs/day: 1.50   • Years: 56.00   • Pack years: 84.00   • Types: Cigarettes   • Quit date:    • Years since quittin.8   Smokeless Tobacco Never   Tobacco Comments    4 cig a day        Alcohol Consumption:  Social History     Substance and Sexual Activity   Alcohol Use No       Depression [place]  PHQ-9:    PHQ-2/PHQ-9 Depression Screening 10/27/2022   Retired PHQ-9 Total Score -   Retired Total Score -   Little Interest or Pleasure in Doing Things 1-->several days   Feeling Down, Depressed or Hopeless 1-->several days   Trouble Falling or Staying Asleep, or Sleeping Too Much 1-->several days   Feeling Tired or Having Little Energy 3-->nearly every day   Poor Appetite or Overeating 0-->not at all   Feeling Bad about Yourself - or that You are a Failure or Have Let Yourself or Your Family Down 1-->several days   Trouble Concentrating on Things, Such as Reading the Newspaper or Watching Television 3-->nearly every day   Moving or Speaking So Slowly that Other People Could Have Noticed? Or the Opposite - Being So Fidgety 0-->not at all   Thoughts that You Would be Better Off Dead or of Hurting Yourself in Some Way 1-->several days   PHQ-9: Brief Depression Severity Measure Score 11   If You Checked Off Any Problems, How Difficult Have These Problems Made It For You to Do Your Work, Take Care of Things at Home, or Get Along with Other People? very difficult      Patient's depression is {SinglevsRecurrent:410608934} and is {MILD, MOD, SEV:04449} {WITH OR WITHOUT:42468::\"without\"} psychosis. Their depression is currently {Remission:501805625} and the condition is {improving/stable/worsenin}. This will be reassessed {plan; " follow-up 2 weeks/4weeks/3months:7417196400}. F/U as described:{[facility]:56515}.     ACE III MINI        Labs  Results for orders placed or performed in visit on 08/06/21   Vitamin B12    Specimen: Blood    BLOOD  RELEASE TO YOLANDA   Result Value Ref Range    Vitamin B-12 505 211 - 946 pg/mL   Vitamin D 25 Hydroxy    Specimen: Blood    BLOOD  RELEASE TO YOLANDA   Result Value Ref Range    25 Hydroxy, Vitamin D 32.0 30.0 - 100.0 ng/ml   TSH    Specimen: Blood    BLOOD  RELEASE TO YOLANDA   Result Value Ref Range    TSH 4.760 (H) 0.270 - 4.200 uIU/mL   Lipid Panel    Specimen: Blood    BLOOD  RELEASE TO YOLANDA   Result Value Ref Range    Total Cholesterol 211 (H) 0 - 200 mg/dL    Triglycerides 154 (H) 0 - 150 mg/dL    HDL Cholesterol 61 (H) 40 - 60 mg/dL    VLDL Cholesterol Power 27 5 - 40 mg/dL    LDL Chol Calc (NIH) 123 (H) 0 - 100 mg/dL   Comprehensive Metabolic Panel    Specimen: Blood    BLOOD  RELEASE TO YOLANDA   Result Value Ref Range    Glucose 87 65 - 99 mg/dL    BUN 13 8 - 23 mg/dL    Creatinine 0.90 0.57 - 1.00 mg/dL    eGFR Non African Am 60 (L) >60 mL/min/1.73    eGFR African Am 73 >60 mL/min/1.73    BUN/Creatinine Ratio 14.4 7.0 - 25.0    Sodium 138 136 - 145 mmol/L    Potassium 4.0 3.5 - 5.2 mmol/L    Chloride 101 98 - 107 mmol/L    Total CO2 24.2 22.0 - 29.0 mmol/L    Calcium 10.1 8.6 - 10.5 mg/dL    Total Protein 7.3 6.0 - 8.5 g/dL    Albumin 4.10 3.50 - 5.20 g/dL    Globulin 3.2 gm/dL    A/G Ratio 1.3 g/dL    Total Bilirubin 0.5 0.0 - 1.2 mg/dL    Alkaline Phosphatase 124 (H) 39 - 117 U/L    AST (SGOT) 22 1 - 32 U/L    ALT (SGPT) 11 1 - 33 U/L   CBC & Differential    Specimen: Blood    BLOOD  RELEASE TO YOLANDA   Result Value Ref Range    WBC 10.17 3.40 - 10.80 10*3/mm3    RBC 4.98 3.77 - 5.28 10*6/mm3    Hemoglobin 15.4 12.0 - 15.9 g/dL    Hematocrit 46.6 34.0 - 46.6 %    MCV 93.6 79.0 - 97.0 fL    MCH 30.9 26.6 - 33.0 pg    MCHC 33.0 31.5 - 35.7 g/dL    RDW 13.1 12.3 - 15.4 %    Platelets 353 140 - 450  10*3/mm3    Neutrophil Rel % 80.4 (H) 42.7 - 76.0 %    Lymphocyte Rel % 11.0 (L) 19.6 - 45.3 %    Monocyte Rel % 7.0 5.0 - 12.0 %    Eosinophil Rel % 0.8 0.3 - 6.2 %    Basophil Rel % 0.4 0.0 - 1.5 %    Neutrophils Absolute 8.18 (H) 1.70 - 7.00 10*3/mm3    Lymphocytes Absolute 1.12 0.70 - 3.10 10*3/mm3    Monocytes Absolute 0.71 0.10 - 0.90 10*3/mm3    Eosinophils Absolute 0.08 0.00 - 0.40 10*3/mm3    Basophils Absolute 0.04 0.00 - 0.20 10*3/mm3    Immature Granulocyte Rel % 0.4 0.0 - 0.5 %    Immature Grans Absolute 0.04 0.00 - 0.05 10*3/mm3    nRBC 0.0 0.0 - 0.2 /100 WBC        Assessment & Plan     Patient Self-Management and Personalized Health Advice    The patient has been provided counseling and guidance about: {MC; PERSONALIZED HEALTH ADVICE:62014} and preventive services including:   · {plan:69920}.  Patient Instructions   Problem List Items Addressed This Visit        Advance Directives and General Issues    Risk for falls       Cardiac and Vasculature    Mixed hyperlipidemia    Overview      pravastatin every evening.  Continue low-fat diet.         Relevant Medications    pravastatin (PRAVACHOL) 20 MG tablet    Other Relevant Orders    CBC & Differential    Comprehensive Metabolic Panel    Homocysteine    Lipid Panel    Urinalysis With Microscopic - Urine, Clean Catch    TSH       Endocrine and Metabolic    Vitamin D deficiency (Chronic)    Relevant Orders    Vitamin D,25-Hydroxy    B12 deficiency    Relevant Orders    Vitamin B12       Genitourinary and Reproductive     Absence of bladder continence    Relevant Medications    oxybutynin XL (DITROPAN-XL) 10 MG 24 hr tablet       Health Encounters    Medicare annual wellness visit, initial - Primary    Overview     We are out of the high-dose flu shot today.  So patient's daughter is encouraged to get it for her from the pharmacy.    Mammogram, colonoscopy, DEXA are not indicated due to patient's age and dementia.         Annual physical exam    Overview      We are out of the high-dose flu shot today.  So patient's daughter is encouraged to get it for her from the pharmacy.              Mental Health    Depression, major, single episode, moderate (HCC)    Overview     Stop  escitalopram (Lexapro) since probably not benefiting patient at this point.         Relevant Medications    memantine (NAMENDA XR) 14 MG capsule sustained-release 24 hr extended release capsule    donepezil (ARICEPT) 5 MG tablet       Neuro    Alzheimer's dementia with behavioral disturbance (HCC) (Chronic)    Overview     Patient with severe dementia, unable to perform her own ADLs.  She is unable to change her diaper.  She does not remember to go to the bathroom.  She does not remember to eat during the day.  She also has hallucinations and has a habit of picking.  She picks the stuffing out of the diapers so they do not work as well. She ends up lying in soiled diapers and soiled sheets.  The patient lives with her daughter and granddaughter who are no longer able to take care of her at home.  It is an unsafe situation with her being at home during the day by herself since all of the family members work.     It is medically necessary for the patient to be placed in a long-term care skilled nursing facility.  Patient has been out of medications, donepezil  and memantine.    We have been unsuccessful getting her pharmacy to fill quetiapine  to see if it would help the psychotic tendencies.     The daughter and granddaughter have attempted to get Medicaid so she can get a bed in a skilled nursing facility.  They have been unsuccessful so far.  They have been told that the psychological evaluation before she can get Medicaid.    We will do a referral for psychological evaluation.  Refer to social care services to help facilitate getting Medicaid and getting a skilled nursing bed.    I will refill the memantine and donepezil today.         Relevant Medications    memantine (NAMENDA XR) 14 MG  capsule sustained-release 24 hr extended release capsule    donepezil (ARICEPT) 5 MG tablet    Other Relevant Orders    Ambulatory Referral to Neuropsychology    Ambulatory Referral to Social Care Services (Amb Case Mgmt)       Symptoms and Signs    No assistance at home (Chronic)    Gait disturbance    Frequent falls    Hallucinations          Diagnosis Plan   1. Medicare annual wellness visit, initial        2. Annual physical exam        3. Alzheimer's dementia with behavioral disturbance (HCC)  Ambulatory Referral to Neuropsychology    Ambulatory Referral to Social Care Services (Amb Case Mgmt)      4. No assistance at home        5. Hallucinations        6. Risk for falls        7. Gait disturbance        8. Depression, major, single episode, moderate (HCC)        9. Frequent falls        10. Vitamin D deficiency  Vitamin D,25-Hydroxy      11. B12 deficiency  Vitamin B12      12. Mixed hyperlipidemia  CBC & Differential    Comprehensive Metabolic Panel    Homocysteine    Lipid Panel    Urinalysis With Microscopic - Urine, Clean Catch    TSH      13. Other urinary incontinence  oxybutynin XL (DITROPAN-XL) 10 MG 24 hr tablet          Outpatient Encounter Medications as of 10/27/2022   Medication Sig Dispense Refill   • Calcium Carb-Cholecalciferol (OS-OSCAR CALCIUM + D3) 500-200 MG-UNIT tablet Take 1 tablet by mouth Daily. 60 tablet 11   • Cholecalciferol (Vitamin D3) 125 MCG (5000 UT) tablet dispersible Place 1 tablet on the tongue Daily. 90 tablet 3   • Cyanocobalamin (B-12) 1000 MCG tablet Take 1 tablet by mouth Daily. 90 tablet 3   • donepezil (ARICEPT) 5 MG tablet Take 1 tablet by mouth Every Evening. 90 tablet 1   • memantine (NAMENDA XR) 14 MG capsule sustained-release 24 hr extended release capsule Take 1 capsule by mouth Daily. 90 capsule 1   • MULTIPLE VITAMIN PO Take 1 tablet by mouth Daily.     • nystatin (MYCOSTATIN) 422950 UNIT/GM cream apply by topical route 2 times every day to the affected area(s)      • olopatadine (Patanol) 0.1 % ophthalmic solution Administer 1 drop to both eyes 2 (Two) Times a Day As Needed for Allergies. 1 each 12   • pravastatin (PRAVACHOL) 20 MG tablet Take 1 tablet by mouth Every Evening. 90 tablet 3   • [DISCONTINUED] escitalopram (LEXAPRO) 20 MG tablet Take 1 tablet by mouth Daily. 90 tablet 1   • oxybutynin XL (DITROPAN-XL) 10 MG 24 hr tablet Take 1 tablet by mouth Daily. 30 tablet 5   • [DISCONTINUED] QUEtiapine (SEROquel) 25 MG tablet Take 1 tablet by mouth Every Night. 30 tablet 5     No facility-administered encounter medications on file as of 10/27/2022.       Reviewed use of high risk medication in the elderly: {Response; yes/no/na:63}  Reviewed for potential of harmful drug interactions in the elderly: {Response; yes/no/na:63}    Age appropriate preventive counseling done including age appropriate vaccines,regular  Mammogram and self breast exam, pap smear, colonoscopy, regular dental visits, mental health, injury prevention such as wearing seat belt and preventing falls, healthy  nutrition, healthy weight, regular physical exercise. Alcohol use is moderate.  Tobacco history-none. Drug use-none.  STD's-not at risk.    Follow Up:  No follow-ups on file.     {Time Spent (Optional):61643}    An After Visit Summary and PPPS with all of these plans were given to the patient.      Note: Part of this note may be an electronic transcription/translation of spoken language to printed text using the Dragon Dictation System.     Estephania Wolfe MD

## 2022-10-27 NOTE — PROGRESS NOTES
"  The ABCs of the Annual Wellness Visit  Initial Medicare Wellness Visit    Chief Complaint   Patient presents with   • Medicare Wellness-subsequent   • Hyperlipidemia       Subjective   History of Present Illness:  Jia Bright is a 81 y.o. female who presents for an Initial Medicare Wellness Visit, and is accompanied by her daughter.    Dementia  The patient's daughter states she feels like the donepezil 5 mg is not helping the patient. She has run out of a lot of her medications recently. She is out of the Namenda. They have not been able to get the quetiapine since the pharmacy still will not fill it despite repeated attempts. The patient's daughter states that the patient will get up in the middle of the night and wander around sometimes. Usually sleeps ok though. Hallucinations.  The daughter reports the patient said someone called the house phone and needed her to \"  'Carlotta' because she was crying\". The daughter states they do not have a house phone. The daughter reports sometimes they can recognize the names and things the patient talks about, but other times it is stuff that she cannot tell much about.     Urinary incontinence   The patient's daughter states that her bathroom issues are the worst. The patient urinates in her pants. She reports the patient picks the cotton out of her diapers so they do not work. She tells her daughter that she didn't know she needed to go.  Lays on her bed in urine since no one there during day to change her diapers.    Placement  The patient's daughter states that any place is about $3000 a month, which is not realistic. They have tried to get her on Medicaid. The patient's daughter states that Medicaid needs a waiver. She will call Medicaid to send all paperwork to fill out for what the patient needs and get it to this office.     Vitamin D deficiency  The patient's daughter states that they need to get her vitamin D and B12 refilled.  She has not been taking " them recently.    Hyperlipidemia  The patient's LDL was 123 in 2021.  She had been out of pravastatin, but recently refilled it.    Depression  The patient's daughter denies that the patient has been taking the Lexapro. She reports the patient sleeps a lot during the day, which is why she wakes up sometimes at night.  She does not feel that her mother seems depressed.  Her mother enjoys watching the granddaughter play.  There is no one there to get the patient to eat during the day. They all work during the daytime. She gets off at 6:30 PM.  Her daughter who also lives there gets off work at 6 PM.    Rash  The patient's daughter states that the main thing with the patient being at home by herself is there is no one to change her diaper, which results in rashes. The patient currently has a rash on her bottom. The patient's daughter states the patient will lay in the bed with no one to change her until they get home. Using Desitin helps some.         CHRONIC CONDITIONS:    HEALTH RISK ASSESSMENT    Recent Hospitalizations:  No hospitalization(s) within the last year.    Current Medical Providers:  Patient Care Team:  Estephania Wolfe MD as PCP - General (Internal Medicine)  Aurelia Rubi MSW as  ([facility]) (Population Health)    Smoking Status:  Social History     Tobacco Use   Smoking Status Former   • Packs/day: 1.50   • Years: 56.00   • Pack years: 84.00   • Types: Cigarettes   • Quit date:    • Years since quittin.8   Smokeless Tobacco Never   Tobacco Comments    4 cig a day        Alcohol Consumption:  Social History     Substance and Sexual Activity   Alcohol Use No       Depression Screen:   PHQ-2/PHQ-9 Depression Screening 10/27/2022   Retired PHQ-9 Total Score -   Retired Total Score -   Little Interest or Pleasure in Doing Things 1-->several days   Feeling Down, Depressed or Hopeless 1-->several days   Trouble Falling or Staying Asleep, or Sleeping Too Much 1-->several days    Feeling Tired or Having Little Energy 3-->nearly every day   Poor Appetite or Overeating 0-->not at all   Feeling Bad about Yourself - or that You are a Failure or Have Let Yourself or Your Family Down 1-->several days   Trouble Concentrating on Things, Such as Reading the Newspaper or Watching Television 3-->nearly every day   Moving or Speaking So Slowly that Other People Could Have Noticed? Or the Opposite - Being So Fidgety 0-->not at all   Thoughts that You Would be Better Off Dead or of Hurting Yourself in Some Way 1-->several days   PHQ-9: Brief Depression Severity Measure Score 11   If You Checked Off Any Problems, How Difficult Have These Problems Made It For You to Do Your Work, Take Care of Things at Home, or Get Along with Other People? very difficult       Fall Risk Screen:  EMMANUEL Fall Risk Assessment was completed, and patient is at HIGH risk for falls. Assessment completed on:10/27/2022    Health Habits and Functional and Cognitive Screening:  Functional & Cognitive Status 10/27/2022   Do you have difficulty preparing food and eating? Yes   Do you have difficulty bathing yourself, getting dressed or grooming yourself? Yes   Do you have difficulty using the toilet? Yes   Do you have difficulty moving around from place to place? Yes   Do you have trouble with steps or getting out of a bed or a chair? Yes   Current Diet Well Balanced Diet   Dental Exam Not up to date        Dental Exam Comment -   Eye Exam Not up to date   Exercise (times per week) 0 times per week   Current Exercises Include No Regular Exercise   Current Exercise Activities Include -   Do you need help using the phone?  Yes   Are you deaf or do you have serious difficulty hearing?  No   Do you need help with transportation? Yes   Do you need help shopping? Yes   Do you need help preparing meals?  Yes   Do you need help with housework?  Yes   Do you need help with laundry? Yes   Do you need help taking your medications? Yes   Do you  need help managing money? Yes   Do you ever drive or ride in a car without wearing a seat belt? No   Have you felt unusual stress, anger or loneliness in the last month? Yes   Who do you live with? Child   If you need help, do you have trouble finding someone available to you? Yes   Have you been bothered in the last four weeks by sexual problems? No   Do you have difficulty concentrating, remembering or making decisions? Yes         Age-appropriate Screening Schedule:  Refer to the list below for future screening recommendations based on patient's age, sex and/or medical conditions. Orders for these recommended tests are listed in the plan section. The patient has been provided with a written plan.    Health Maintenance   Topic Date Due   • TDAP/TD VACCINES (1 - Tdap) Never done   • INFLUENZA VACCINE  08/01/2022   • LIPID PANEL  10/27/2023   • DXA SCAN  Discontinued   • ZOSTER VACCINE  Discontinued        The following portions of the patient's history were reviewed and updated as appropriate: allergies, current medications, past family history, past medical history, past social history, past surgical history and problem list.    Does the patient have evidence of cognitive impairment? Yes    Asprin use counseling:Does not need ASA (and currently is not on it)    Outpatient Medications Prior to Visit   Medication Sig Dispense Refill   • Calcium Carb-Cholecalciferol (OS-OSCAR CALCIUM + D3) 500-200 MG-UNIT tablet Take 1 tablet by mouth Daily. 60 tablet 11   • donepezil (ARICEPT) 5 MG tablet Take 1 tablet by mouth Every Evening. 90 tablet 1   • memantine (NAMENDA XR) 14 MG capsule sustained-release 24 hr extended release capsule Take 1 capsule by mouth Daily. 90 capsule 1   • MULTIPLE VITAMIN PO Take 1 tablet by mouth Daily.     • nystatin (MYCOSTATIN) 663807 UNIT/GM cream apply by topical route 2 times every day to the affected area(s)     • olopatadine (Patanol) 0.1 % ophthalmic solution Administer 1 drop to both eyes  "2 (Two) Times a Day As Needed for Allergies. 1 each 12   • pravastatin (PRAVACHOL) 20 MG tablet Take 1 tablet by mouth Every Evening. 90 tablet 3   • Cholecalciferol (Vitamin D3) 125 MCG (5000 UT) tablet dispersible Place 1 tablet on the tongue Daily. 90 tablet 3   • Cyanocobalamin (B-12) 1000 MCG tablet Take 1 tablet by mouth Daily. 90 tablet 3   • escitalopram (LEXAPRO) 20 MG tablet Take 1 tablet by mouth Daily. 90 tablet 1   • QUEtiapine (SEROquel) 25 MG tablet Take 1 tablet by mouth Every Night. 30 tablet 5     No facility-administered medications prior to visit.       Patient Active Problem List   Diagnosis   • Alzheimer's dementia with behavioral disturbance (HCC)   • Risk for falls   • Gait disturbance   • Mixed hyperlipidemia   • Depression, major, single episode, moderate (HCC)   • B12 deficiency   • Shortness of breath on exertion   • Menopausal and perimenopausal disorder   • Visit for screening mammogram   • Frequent unifocal PVCs   • Frequent falls   • Hallucinations   • Vitamin D deficiency   • Medicare annual wellness visit, initial   • Annual physical exam   • Abnormal weight loss   • No assistance at home   • Absence of bladder continence   • Diaper rash       Advanced Care Planning:  ACP discussion was held with the patient during this visit. Patient does not have an advance directive, declines further assistance.    Review of Systems    Compared to one year ago, the patient feels her physical health is worse.  Compared to one year ago, the patient feels her mental health is worse.    Reviewed chart for potential of high risk medication in the elderly: yes  Reviewed chart for potential of harmful drug interactions in the elderly:yes    Objective         Vitals:    10/27/22 1207   BP: 120/68   BP Location: Left arm   Patient Position: Sitting   Cuff Size: Adult   Pulse: 85   Temp: 96.4 °F (35.8 °C)   TempSrc: Infrared   SpO2: 98%   Weight: 59.5 kg (131 lb 3.2 oz)   Height: 158 cm (62.21\")   PainSc: " 0-No pain     BMI is within normal parameters. No other follow-up for BMI required.    Body mass index is 23.84 kg/m².  Discussed the patient's BMI with her. The BMI is in the acceptable range.    Physical Exam  Vitals and nursing note reviewed.   Constitutional:       Appearance: She is well-developed.   Eyes:      Conjunctiva/sclera: Conjunctivae normal.      Pupils: Pupils are equal, round, and reactive to light.   Neck:      Thyroid: No thyromegaly.   Cardiovascular:      Rate and Rhythm: Normal rate and regular rhythm.      Heart sounds: Normal heart sounds. No murmur heard.  Pulmonary:      Effort: Pulmonary effort is normal.      Breath sounds: Normal breath sounds. No wheezing.   Abdominal:      General: Bowel sounds are normal. There is no distension.      Palpations: Abdomen is soft. There is no mass.      Tenderness: There is no abdominal tenderness.   Musculoskeletal:         General: No tenderness. Normal range of motion.      Cervical back: Normal range of motion and neck supple.   Lymphadenopathy:      Cervical: No cervical adenopathy.   Skin:     General: Skin is warm and dry.      Findings: No rash.   Neurological:      Mental Status: She is alert and oriented to person, place, and time.      Cranial Nerves: No cranial nerve deficit.      Sensory: No sensory deficit.      Coordination: Coordination normal.      Gait: Gait normal.      Comments: Abnormal gait.   Psychiatric:         Speech: Speech normal.         Behavior: Behavior normal.         Thought Content: Thought content normal.         Judgment: Judgment normal.      Comments: Normal attention, normal mood. Impaired cognition, and inappropriate judgement.         Lab Results   Component Value Date    CHLPL 199 10/27/2022    TRIG 187 (H) 10/27/2022    HDL 60 10/27/2022     (H) 10/27/2022    VLDL 32 10/27/2022        Assessment & Plan   Medicare Risks and Personalized Health Plan  CMS Preventative Services Quick  Reference  Cardiovascular risk    The above risks/problems have been discussed with the patient.  Pertinent information has been shared with the patient in the After Visit Summary.  Follow up plans and orders are seen below in the Assessment/Plan Section.  Patient Instructions   Problem List Items Addressed This Visit        Advance Directives and General Issues    Risk for falls    Current Assessment & Plan     Patient will continue using walker or wheelchair. One drawback is that the patient forgets that she is at risk for falling and forgets to use the walker.            Cardiac and Vasculature    Mixed hyperlipidemia    Overview      pravastatin every evening.  Continue low-fat diet.         Current Assessment & Plan     She has been out of pravastatin, but will resume that in the evenings.         Relevant Medications    pravastatin (PRAVACHOL) 20 MG tablet    Other Relevant Orders    CBC & Differential (Completed)    Comprehensive Metabolic Panel (Completed)    Homocysteine (Completed)    Lipid Panel (Completed)    Urinalysis With Microscopic - Urine, Clean Catch (Completed)    TSH (Completed)       Endocrine and Metabolic    Vitamin D deficiency (Chronic)    Current Assessment & Plan     The daughter will replace her B12 tablets and give her one a day.         Relevant Orders    Vitamin D,25-Hydroxy (Completed)    B12 deficiency    Relevant Orders    Vitamin B12 (Completed)       Genitourinary and Reproductive     Absence of bladder continence    Current Assessment & Plan     We will try oxybutynin tablet every evening. Daughter was advised that if it is not helping in a month, then she can just stop it.         Relevant Medications    oxybutynin XL (DITROPAN-XL) 10 MG 24 hr tablet       Health Encounters    Medicare annual wellness visit, initial - Primary    Overview     We are out of the high-dose flu shot today.  So patient's daughter is encouraged to get it for her from the pharmacy.    Mammogram,  colonoscopy, DEXA are not indicated due to patient's age and dementia.         Annual physical exam    Overview     We are out of the high-dose flu shot today.  So patient's daughter is encouraged to get it for her from the pharmacy.              Mental Health    Depression, major, single episode, moderate (HCC)    Overview     Stop  escitalopram (Lexapro) since probably not benefiting patient at this point.         Relevant Medications    memantine (NAMENDA XR) 14 MG capsule sustained-release 24 hr extended release capsule    donepezil (ARICEPT) 5 MG tablet       Neuro    Alzheimer's dementia with behavioral disturbance (HCC) (Chronic)    Overview     Patient with severe dementia, unable to perform her own ADLs.  She is unable to change her diaper.  She does not remember to go to the bathroom.  She does not remember to eat during the day.  She also has hallucinations and has a habit of picking.  She picks the stuffing out of the diapers so they do not work as well. She ends up lying in soiled diapers and soiled sheets.  The patient lives with her daughter and granddaughter who are no longer able to take care of her at home.  It is an unsafe situation with her being at home during the day by herself since all of the family members work.     It is medically necessary for the patient to be placed in a long-term care skilled nursing facility.  Patient has been out of medications, donepezil  and memantine.    We have been unsuccessful getting her pharmacy to fill quetiapine  to see if it would help the psychotic tendencies.     The daughter and granddaughter have attempted to get Medicaid so she can get a bed in a skilled nursing facility.  They have been unsuccessful so far.  They have been told that the psychological evaluation before she can get Medicaid.    We will do a referral for psychological evaluation.  Refer to social care services to help facilitate getting Medicaid and getting a skilled nursing bed.    I  will refill the memantine and donepezil today.         Relevant Medications    memantine (NAMENDA XR) 14 MG capsule sustained-release 24 hr extended release capsule    donepezil (ARICEPT) 5 MG tablet    Other Relevant Orders    Ambulatory Referral to Neuropsychology    Ambulatory Referral to Social Care Services (Amb Case Mgmt)       Skin    Diaper rash    Relevant Medications    nystatin (MYCOSTATIN) [id number] UNIT/GM cream       Symptoms and Signs    No assistance at home (Chronic)    Current Assessment & Plan     See above.         Gait disturbance    Current Assessment & Plan     See above.         Frequent falls    Current Assessment & Plan     See above.         Hallucinations    Current Assessment & Plan     See above.             Follow Up:  Return in about 6 months (around 4/27/2023) for Recheck.     An After Visit Summary and PPPS were given to the patient.         Transcribed from ambient dictation for Estephania Wolfe MD by Mariah Carver.  10/28/22   09:22 EDT    Patient or patient representative verbalized consent to the visit recording.  I have personally performed the services described in this document as transcribed by the above individual, and it is both accurate and complete.

## 2022-10-27 NOTE — PATIENT INSTRUCTIONS
Patient Instructions   Problem List Items Addressed This Visit          Advance Directives and General Issues    Risk for falls       Cardiac and Vasculature    Mixed hyperlipidemia    Overview      pravastatin every evening.  Continue low-fat diet.         Relevant Medications    pravastatin (PRAVACHOL) 20 MG tablet    Other Relevant Orders    CBC & Differential    Comprehensive Metabolic Panel    Homocysteine    Lipid Panel    Urinalysis With Microscopic - Urine, Clean Catch    TSH       Endocrine and Metabolic    Vitamin D deficiency (Chronic)    Relevant Orders    Vitamin D,25-Hydroxy    B12 deficiency    Relevant Orders    Vitamin B12       Genitourinary and Reproductive     Absence of bladder continence    Relevant Medications    oxybutynin XL (DITROPAN-XL) 10 MG 24 hr tablet       Health Encounters    Medicare annual wellness visit, initial - Primary    Overview     We are out of the high-dose flu shot today.  So patient's daughter is encouraged to get it for her from the pharmacy.    Mammogram, colonoscopy, DEXA are not indicated due to patient's age and dementia.         Annual physical exam    Overview     We are out of the high-dose flu shot today.  So patient's daughter is encouraged to get it for her from the pharmacy.              Mental Health    Depression, major, single episode, moderate (HCC)    Overview     Stop  escitalopram (Lexapro) since probably not benefiting patient at this point.         Relevant Medications    memantine (NAMENDA XR) 14 MG capsule sustained-release 24 hr extended release capsule    donepezil (ARICEPT) 5 MG tablet       Neuro    Alzheimer's dementia with behavioral disturbance (HCC) (Chronic)    Overview     Patient with severe dementia, unable to perform her own ADLs.  She is unable to change her diaper.  She does not remember to go to the bathroom.  She does not remember to eat during the day.  She also has hallucinations and has a habit of picking.  She picks the  stuffing out of the diapers so they do not work as well. She ends up lying in soiled diapers and soiled sheets.  The patient lives with her daughter and granddaughter who are no longer able to take care of her at home.  It is an unsafe situation with her being at home during the day by herself since all of the family members work.     It is medically necessary for the patient to be placed in a long-term care skilled nursing facility.  Patient has been out of medications, donepezil  and memantine.    We have been unsuccessful getting her pharmacy to fill quetiapine  to see if it would help the psychotic tendencies.     The daughter and granddaughter have attempted to get Medicaid so she can get a bed in a skilled nursing facility.  They have been unsuccessful so far.  They have been told that the psychological evaluation before she can get Medicaid.    We will do a referral for psychological evaluation.  Refer to social care services to help facilitate getting Medicaid and getting a skilled nursing bed.    I will refill the memantine and donepezil today.         Relevant Medications    memantine (NAMENDA XR) 14 MG capsule sustained-release 24 hr extended release capsule    donepezil (ARICEPT) 5 MG tablet    Other Relevant Orders    Ambulatory Referral to Neuropsychology    Ambulatory Referral to Social Care Services (Amb Case Mgmt)       Symptoms and Signs    No assistance at home (Chronic)    Gait disturbance    Frequent falls    Hallucinations     Patient Instructions   Problem List Items Addressed This Visit          Advance Directives and General Issues    Risk for falls    Current Assessment & Plan     Patient will continue using walker or wheelchair. One drawback is that the patient forgets that she is at risk for falling and forgets to use the walker.            Cardiac and Vasculature    Mixed hyperlipidemia    Overview      pravastatin every evening.  Continue low-fat diet.         Current Assessment & Plan      She has been out of pravastatin, but will resume that in the evenings.         Relevant Medications    pravastatin (PRAVACHOL) 20 MG tablet    Other Relevant Orders    CBC & Differential (Completed)    Comprehensive Metabolic Panel (Completed)    Homocysteine (Completed)    Lipid Panel (Completed)    Urinalysis With Microscopic - Urine, Clean Catch (Completed)    TSH (Completed)       Endocrine and Metabolic    Vitamin D deficiency (Chronic)    Current Assessment & Plan     The daughter will replace her B12 tablets and give her one a day.         Relevant Orders    Vitamin D,25-Hydroxy (Completed)    B12 deficiency    Relevant Orders    Vitamin B12 (Completed)       Genitourinary and Reproductive     Absence of bladder continence    Current Assessment & Plan     We will try oxybutynin tablet every evening. Daughter was advised that if it is not helping in a month, then she can just stop it.         Relevant Medications    oxybutynin XL (DITROPAN-XL) 10 MG 24 hr tablet       Health Encounters    Medicare annual wellness visit, initial - Primary    Overview     We are out of the high-dose flu shot today.  So patient's daughter is encouraged to get it for her from the pharmacy.    Mammogram, colonoscopy, DEXA are not indicated due to patient's age and dementia.         Annual physical exam    Overview     We are out of the high-dose flu shot today.  So patient's daughter is encouraged to get it for her from the pharmacy.              Mental Health    Depression, major, single episode, moderate (HCC)    Overview     Stop  escitalopram (Lexapro) since probably not benefiting patient at this point.         Relevant Medications    memantine (NAMENDA XR) 14 MG capsule sustained-release 24 hr extended release capsule    donepezil (ARICEPT) 5 MG tablet       Neuro    Alzheimer's dementia with behavioral disturbance (HCC) (Chronic)    Overview     Patient with severe dementia, unable to perform her own ADLs.  She is unable  to change her diaper.  She does not remember to go to the bathroom.  She does not remember to eat during the day.  She also has hallucinations and has a habit of picking.  She picks the stuffing out of the diapers so they do not work as well. She ends up lying in soiled diapers and soiled sheets.  The patient lives with her daughter and granddaughter who are no longer able to take care of her at home.  It is an unsafe situation with her being at home during the day by herself since all of the family members work.     It is medically necessary for the patient to be placed in a long-term care skilled nursing facility.  Patient has been out of medications, donepezil  and memantine.    We have been unsuccessful getting her pharmacy to fill quetiapine  to see if it would help the psychotic tendencies.     The daughter and granddaughter have attempted to get Medicaid so she can get a bed in a skilled nursing facility.  They have been unsuccessful so far.  They have been told that the psychological evaluation before she can get Medicaid.    We will do a referral for psychological evaluation.  Refer to social care services to help facilitate getting Medicaid and getting a skilled nursing bed.    I will refill the memantine and donepezil today.         Relevant Medications    memantine (NAMENDA XR) 14 MG capsule sustained-release 24 hr extended release capsule    donepezil (ARICEPT) 5 MG tablet    Other Relevant Orders    Ambulatory Referral to Neuropsychology    Ambulatory Referral to Social Care Services (Amb Case Mgmt)       Skin    Diaper rash    Relevant Medications    nystatin (MYCOSTATIN) 368438 UNIT/GM cream       Symptoms and Signs    No assistance at home (Chronic)    Current Assessment & Plan     See above.         Gait disturbance    Current Assessment & Plan     See above.         Frequent falls    Current Assessment & Plan     See above.         Hallucinations    Current Assessment & Plan     See above.          Fall Prevention in the Home, Adult  Falls can cause injuries and affect people of all ages. There are many simple things that you can do to make your home safe and to help prevent falls. Ask for help when making these changes, if needed.  What actions can I take to prevent falls?  General instructions  Use good lighting in all rooms. Replace any light bulbs that burn out, turn on lights if it is dark, and use night-lights.  Place frequently used items in easy-to-reach places. Lower the shelves around your home if necessary.  Set up furniture so that there are clear paths around it. Avoid moving your furniture around.  Remove throw rugs and other tripping hazards from the floor.  Avoid walking on wet floors.  Fix any uneven floor surfaces.  Add color or contrast paint or tape to grab bars and handrails in your home. Place contrasting color strips on the first and last steps of staircases.  When you use a stepladder, make sure that it is completely opened and that the sides and supports are firmly locked. Have someone hold the ladder while you are using it. Do not climb a closed stepladder.  Know where your pets are when moving through your home.  What can I do in the bathroom?     Keep the floor dry. Immediately clean up any water that is on the floor.  Remove soap buildup in the tub or shower regularly.  Use nonskid mats or decals on the floor of the tub or shower.  Attach bath mats securely with double-sided, nonslip rug tape.  If you need to sit down while you are in the shower, use a plastic, nonslip stool.  Install grab bars by the toilet and in the tub and shower. Do not use towel bars as grab bars.  What can I do in the bedroom?  Make sure that a bedside light is easy to reach.  Do not use oversized bedding that reaches the floor.  Have a firm chair that has side arms to use for getting dressed.  What can I do in the kitchen?  Clean up any spills right away.  If you need to reach for something above you, use a  sturdy step stool that has a grab bar.  Keep electrical cables out of the way.  Do not use floor polish or wax that makes floors slippery. If you must use wax, make sure that it is non-skid floor wax.  What can I do with my stairs?  Do not leave any items on the stairs.  Make sure that you have a light switch at the top and the bottom of the stairs. Have them installed if you do not have them.  Make sure that there are handrails on both sides of the stairs. Fix handrails that are broken or loose. Make sure that handrails are as long as the staircases.  Install non-slip stair treads on all stairs in your home.  Avoid having throw rugs at the top or bottom of stairs, or secure the rugs with carpet tape to prevent them from moving.  Choose a carpet design that does not hide the edge of steps on the stairs.  Check any carpeting to make sure that it is firmly attached to the stairs. Fix any carpet that is loose or worn.  What can I do on the outside of my home?  Use bright outdoor lighting.  Regularly repair the edges of walkways and driveways and fix any cracks.  Remove high doorway thresholds.  Trim any shrubbery on the main path into your home.  Regularly check that handrails are securely fastened and in good repair. Both sides of all steps should have handrails.  Install guardrails along the edges of any raised decks or porches.  Clear walkways of debris and clutter, including tools and rocks.  Have leaves, snow, and ice cleared regularly.  Use sand or salt on walkways during winter months.  In the garage, clean up any spills right away, including grease or oil spills.  What other actions can I take?  Wear closed-toe shoes that fit well and support your feet. Wear shoes that have rubber soles or low heels.  Use mobility aids as needed, such as canes, walkers, scooters, and crutches.  Review your medicines with your health care provider. Some medicines can cause dizziness or changes in blood pressure, which increase  your risk of falling.  Talk with your health care provider about other ways that you can decrease your risk of falls. This may include working with a physical therapist or  to improve your strength, balance, and endurance.  Where to find more information  Centers for Disease Control and PreventionEMMANUEL: www.cdc.gov  National [place] on Aging: www.romy.nih.gov  Contact a health care provider if:  You are afraid of falling at home.  You feel weak, drowsy, or dizzy at home.  You fall at home.  Summary  There are many simple things that you can do to make your home safe and to help prevent falls.  Ways to make your home safe include removing tripping hazards and installing grab bars in the bathroom.  Ask for help when making these changes in your home.  This information is not intended to replace advice given to you by your health care provider. Make sure you discuss any questions you have with your health care provider.  Document Revised: 07/21/2021 Document Reviewed: 07/21/2021  [employer] Patient Education © 2022 [employer]  Fall Prevention in the Home, Adult  Falls can cause injuries and affect people of all ages. There are many simple things that you can do to make your home safe and to help prevent falls. Ask for help when making these changes, if needed.  What actions can I take to prevent falls?  General instructions  Use good lighting in all rooms. Replace any light bulbs that burn out, turn on lights if it is dark, and use night-lights.  Place frequently used items in easy-to-reach places. Lower the shelves around your home if necessary.  Set up furniture so that there are clear paths around it. Avoid moving your furniture around.  Remove throw rugs and other tripping hazards from the floor.  Avoid walking on wet floors.  Fix any uneven floor surfaces.  Add color or contrast paint or tape to grab bars and handrails in your home. Place contrasting color strips on the first and last steps of  staircases.  When you use a stepladder, make sure that it is completely opened and that the sides and supports are firmly locked. Have someone hold the ladder while you are using it. Do not climb a closed stepladder.  Know where your pets are when moving through your home.  What can I do in the bathroom?     Keep the floor dry. Immediately clean up any water that is on the floor.  Remove soap buildup in the tub or shower regularly.  Use nonskid mats or decals on the floor of the tub or shower.  Attach bath mats securely with double-sided, nonslip rug tape.  If you need to sit down while you are in the shower, use a plastic, nonslip stool.  Install grab bars by the toilet and in the tub and shower. Do not use towel bars as grab bars.  What can I do in the bedroom?  Make sure that a bedside light is easy to reach.  Do not use oversized bedding that reaches the floor.  Have a firm chair that has side arms to use for getting dressed.  What can I do in the kitchen?  Clean up any spills right away.  If you need to reach for something above you, use a sturdy step stool that has a grab bar.  Keep electrical cables out of the way.  Do not use floor polish or wax that makes floors slippery. If you must use wax, make sure that it is non-skid floor wax.  What can I do with my stairs?  Do not leave any items on the stairs.  Make sure that you have a light switch at the top and the bottom of the stairs. Have them installed if you do not have them.  Make sure that there are handrails on both sides of the stairs. Fix handrails that are broken or loose. Make sure that handrails are as long as the staircases.  Install non-slip stair treads on all stairs in your home.  Avoid having throw rugs at the top or bottom of stairs, or secure the rugs with carpet tape to prevent them from moving.  Choose a carpet design that does not hide the edge of steps on the stairs.  Check any carpeting to make sure that it is firmly attached to the  stairs. Fix any carpet that is loose or worn.  What can I do on the outside of my home?  Use bright outdoor lighting.  Regularly repair the edges of walkways and driveways and fix any cracks.  Remove high doorway thresholds.  Trim any shrubbery on the main path into your home.  Regularly check that handrails are securely fastened and in good repair. Both sides of all steps should have handrails.  Install guardrails along the edges of any raised decks or porches.  Clear walkways of debris and clutter, including tools and rocks.  Have leaves, snow, and ice cleared regularly.  Use sand or salt on walkways during winter months.  In the garage, clean up any spills right away, including grease or oil spills.  What other actions can I take?  Wear closed-toe shoes that fit well and support your feet. Wear shoes that have rubber soles or low heels.  Use mobility aids as needed, such as canes, walkers, scooters, and crutches.  Review your medicines with your health care provider. Some medicines can cause dizziness or changes in blood pressure, which increase your risk of falling.  Talk with your health care provider about other ways that you can decrease your risk of falls. This may include working with a physical therapist or  to improve your strength, balance, and endurance.  Where to find more information  Centers for Disease Control and Prevention [place]: www.cdc.gov  National [place] on Aging: www.romy.nih.gov  Contact a health care provider if:  You are afraid of falling at home.  You feel weak, drowsy, or dizzy at home.  You fall at home.  Summary  There are many simple things that you can do to make your home safe and to help prevent falls.  Ways to make your home safe include removing tripping hazards and installing grab bars in the bathroom.  Ask for help when making these changes in your home.  This information is not intended to replace advice given to you by your health care provider. Make sure you  discuss any questions you have with your health care provider.  Document Revised: 07/21/2021 Document Reviewed: 07/21/2021  [employer] Patient Education © 2022 [employer]

## 2022-10-28 ENCOUNTER — REFERRAL TRIAGE (OUTPATIENT)
Dept: CASE MANAGEMENT | Facility: OTHER | Age: 82
End: 2022-10-28

## 2022-10-28 LAB
25(OH)D3+25(OH)D2 SERPL-MCNC: 15.7 NG/ML (ref 30–100)
ALBUMIN SERPL-MCNC: 4.4 G/DL (ref 3.7–4.7)
ALBUMIN/GLOB SERPL: 1.3 {RATIO} (ref 1.2–2.2)
ALP SERPL-CCNC: 118 IU/L (ref 44–121)
ALT SERPL-CCNC: 11 IU/L (ref 0–32)
APPEARANCE UR: ABNORMAL
AST SERPL-CCNC: 18 IU/L (ref 0–40)
BACTERIA #/AREA URNS HPF: ABNORMAL /[HPF]
BASOPHILS # BLD AUTO: 0 X10E3/UL (ref 0–0.2)
BASOPHILS NFR BLD AUTO: 1 %
BILIRUB SERPL-MCNC: 0.4 MG/DL (ref 0–1.2)
BILIRUB UR QL STRIP: NEGATIVE
BUN SERPL-MCNC: 15 MG/DL (ref 8–27)
BUN/CREAT SERPL: 19 (ref 12–28)
CALCIUM SERPL-MCNC: 9.6 MG/DL (ref 8.7–10.3)
CASTS URNS QL MICRO: ABNORMAL /LPF
CHLORIDE SERPL-SCNC: 101 MMOL/L (ref 96–106)
CHOLEST SERPL-MCNC: 199 MG/DL (ref 100–199)
CO2 SERPL-SCNC: 23 MMOL/L (ref 20–29)
COLOR UR: YELLOW
CREAT SERPL-MCNC: 0.78 MG/DL (ref 0.57–1)
EGFRCR SERPLBLD CKD-EPI 2021: 77 ML/MIN/1.73
EOSINOPHIL # BLD AUTO: 0.2 X10E3/UL (ref 0–0.4)
EOSINOPHIL NFR BLD AUTO: 3 %
EPI CELLS #/AREA URNS HPF: >10 /HPF (ref 0–10)
ERYTHROCYTE [DISTWIDTH] IN BLOOD BY AUTOMATED COUNT: 12.4 % (ref 11.7–15.4)
GLOBULIN SER CALC-MCNC: 3.4 G/DL (ref 1.5–4.5)
GLUCOSE SERPL-MCNC: 91 MG/DL (ref 70–99)
GLUCOSE UR QL STRIP: NEGATIVE
HCT VFR BLD AUTO: 42.8 % (ref 34–46.6)
HCYS SERPL-SCNC: 13.2 UMOL/L (ref 0–19.2)
HDLC SERPL-MCNC: 60 MG/DL
HGB BLD-MCNC: 14 G/DL (ref 11.1–15.9)
HGB UR QL STRIP: NEGATIVE
IMM GRANULOCYTES # BLD AUTO: 0 X10E3/UL (ref 0–0.1)
IMM GRANULOCYTES NFR BLD AUTO: 0 %
KETONES UR QL STRIP: NEGATIVE
LDLC SERPL CALC-MCNC: 107 MG/DL (ref 0–99)
LEUKOCYTE ESTERASE UR QL STRIP: NEGATIVE
LYMPHOCYTES # BLD AUTO: 0.9 X10E3/UL (ref 0.7–3.1)
LYMPHOCYTES NFR BLD AUTO: 16 %
MCH RBC QN AUTO: 29.9 PG (ref 26.6–33)
MCHC RBC AUTO-ENTMCNC: 32.7 G/DL (ref 31.5–35.7)
MCV RBC AUTO: 91 FL (ref 79–97)
MICRO URNS: ABNORMAL
MICRO URNS: ABNORMAL
MONOCYTES # BLD AUTO: 0.6 X10E3/UL (ref 0.1–0.9)
MONOCYTES NFR BLD AUTO: 10 %
NEUTROPHILS # BLD AUTO: 3.8 X10E3/UL (ref 1.4–7)
NEUTROPHILS NFR BLD AUTO: 70 %
NITRITE UR QL STRIP: NEGATIVE
PH UR STRIP: 6 [PH] (ref 5–7.5)
PLATELET # BLD AUTO: 322 X10E3/UL (ref 150–450)
POTASSIUM SERPL-SCNC: 4.3 MMOL/L (ref 3.5–5.2)
PROT SERPL-MCNC: 7.8 G/DL (ref 6–8.5)
PROT UR QL STRIP: ABNORMAL
RBC # BLD AUTO: 4.69 X10E6/UL (ref 3.77–5.28)
RBC #/AREA URNS HPF: ABNORMAL /HPF (ref 0–2)
SODIUM SERPL-SCNC: 139 MMOL/L (ref 134–144)
SP GR UR STRIP: 1.02 (ref 1–1.03)
TRIGL SERPL-MCNC: 187 MG/DL (ref 0–149)
TSH SERPL DL<=0.005 MIU/L-ACNC: 3.97 UIU/ML (ref 0.45–4.5)
UROBILINOGEN UR STRIP-MCNC: 0.2 MG/DL (ref 0.2–1)
VIT B12 SERPL-MCNC: 335 PG/ML (ref 232–1245)
VLDLC SERPL CALC-MCNC: 32 MG/DL (ref 5–40)
WBC # BLD AUTO: 5.5 X10E3/UL (ref 3.4–10.8)
WBC #/AREA URNS HPF: ABNORMAL /HPF (ref 0–5)

## 2022-10-28 RX ORDER — CYANOCOBALAMIN (VITAMIN B-12) 1000 MCG
1 TABLET ORAL DAILY
Qty: 90 TABLET | Refills: 3 | Status: SHIPPED | OUTPATIENT
Start: 2022-10-28

## 2022-10-28 NOTE — ASSESSMENT & PLAN NOTE
Patient will continue using walker or wheelchair. One drawback is that the patient forgets that she is at risk for falling and forgets to use the walker.

## 2022-10-28 NOTE — TELEPHONE ENCOUNTER
LM for pt daughter to call back.    ----- Message from Estephania Wolfe MD sent at 10/28/2022 12:56 PM EDT -----  Please call patient's daughter.  Also mail the lab letter.    Vitamin B12 is low.  Resume B12 tablet daily.  A prescription was sent to the pharmacy.    Vitamin D is very deficient at 15.7.  Goal is greater than 50.  Resume vitamin D3 tablet daily.  A prescription was sent to the pharmacy.    LDL (bad cholesterol) has improved from 123 down to 107.  Goal is less than 100.  Triglycerides are elevated at 187.  Goal is less than 150.  Try to eat less sugars and snack foods.    Thyroid level, urinalysis, folate level, blood sugar, kidney and liver function, blood cell counts, are all acceptable.

## 2022-10-28 NOTE — ASSESSMENT & PLAN NOTE
We will try oxybutynin tablet every evening. Daughter was advised that if it is not helping in a month, then she can just stop it.

## 2022-10-29 PROBLEM — L22 DIAPER RASH: Status: ACTIVE | Noted: 2022-10-29

## 2022-10-31 RX ORDER — DONEPEZIL HYDROCHLORIDE 5 MG/1
5 TABLET, FILM COATED ORAL EVERY EVENING
Qty: 90 TABLET | Refills: 1 | Status: SHIPPED | OUTPATIENT
Start: 2022-10-31

## 2022-10-31 RX ORDER — MEMANTINE HYDROCHLORIDE 14 MG/1
14 CAPSULE, EXTENDED RELEASE ORAL DAILY
Qty: 90 CAPSULE | Refills: 1 | Status: SHIPPED | OUTPATIENT
Start: 2022-10-31

## 2022-10-31 NOTE — TELEPHONE ENCOUNTER
Pt daughter advised and verbalized understanding. She states that they need RF on the memantine and donepezil.

## 2022-11-02 ENCOUNTER — PATIENT OUTREACH (OUTPATIENT)
Dept: CASE MANAGEMENT | Facility: OTHER | Age: 82
End: 2022-11-02

## 2022-11-02 NOTE — OUTREACH NOTE
Social Work Assessment  Questions/Answers    Flowsheet Row Most Recent Value   Referral Source physician   Reason for Consult community resources   Preferred Language English   People in Home child(carrie), adult, grandchild(carrie)   Current Living Arrangements home   Primary Care Provided by child(carrie)   Provides Primary Care For no one, unable/limited ability to care for self   Family Caregiver if Needed child(carrie), adult   Quality of Family Relationships supportive   Employment Status retired   Source of Income social security   Usual Activity Tolerance good   Current Activity Tolerance good   Medications completely dependent   Meal Preparation completely dependent   Housekeeping completely dependent   Laundry completely dependent   Shopping completely dependent      SDOH updated and reviewed with the patient during this program:  Financial Resource Strain: Low Risk    • Difficulty of Paying Living Expenses: Not hard at all      Food Insecurity: No Food Insecurity   • Worried About Running Out of Food in the Last Year: Never true   • Ran Out of Food in the Last Year: Never true      Transportation Needs: No Transportation Needs   • Lack of Transportation (Medical): No   • Lack of Transportation (Non-Medical): No      Housing Stability: Low Risk    • Unable to Pay for Housing in the Last Year: No   • Number of Places Lived in the Last Year: 1   • Unstable Housing in the Last Year: No      Patient Outreach    SW contacted pt daughter. Pt lives with her daughter and her granddaughter. Because they both work, they are unable to care for pt. They have tried to get her placed in a nursing home, but no one would accept her unless she already had medicaid. They applied for medicaid in the past but they were declined. Pt daughter is not sure why. Pt granddaughter is working on applying again and has a list of things they need to do to get a medicaid waiver. Pt daughter is unsure of what those things are and grandaughter is not  home right now. Pt daughter says she will get that information and will call SW back..    ALFRED TORRES -   Ambulatory Case Management    11/2/2022, 16:29 EDT

## 2022-11-09 ENCOUNTER — TELEPHONE (OUTPATIENT)
Dept: INTERNAL MEDICINE | Facility: CLINIC | Age: 82
End: 2022-11-09

## 2022-11-09 NOTE — TELEPHONE ENCOUNTER
I spoke with patient's granddaughter after speaking with Mckenzie, our referral coordinator.  We have placed a referral to .  Mckenzie left a message with that office asking them to contact the patient's family.   Family is wanting patient to get on Medicaid so she can be placed in a facility that has a medicaid bed available.    Granddaughter verbalized understanding.

## 2022-11-09 NOTE — TELEPHONE ENCOUNTER
Caller: YANA    Relationship: Grandchild    Best call back number:    [phone]     What was the call regarding: PATIENT'S GRANDDAUGHTER CALLED TO REQUEST LEVEL OF CARE FROM  DR SANTA.  SHE STATED THAT SHE WAS TOLD THAT SHE WOULD NEED TO GO IN TO [facility]. PATIENT IS TRYING TO GET INTO A NURSING FACILITY.     Do you require a callback: YES

## 2022-11-11 ENCOUNTER — PATIENT OUTREACH (OUTPATIENT)
Dept: CASE MANAGEMENT | Facility: OTHER | Age: 82
End: 2022-11-11

## 2022-11-11 NOTE — OUTREACH NOTE
Patient Outreach    PARKER received a call from pt daughter. Pt daughter reports that pt rejiughter spoke with medicaid office and they told her they needed a level of care assessment but are not sure what that is. PARKER offered to contact pt werner. Glenis [phone] reports that she has been told different things by the medicaid office, but she says the last conversation she had with a liaison she was told that pt was approved for medicaid and would just need a level of care assessment. They were told that the nursing home could do that. Tashi was previously told that she needed a MAP 10. PARKER advised that Map 10 would need to be completed for Waiver services. PARKER also advised that level of care would be completed by medicaid nurse if they were applying for a medicaid waiver. Pt werner states that a medicaid waiver would be okay if they could not get a nursing home. Pt tashi has been calling nursing homes but has not received a callback. PARKER offered to send referrals as well and werner accepted. PARKER sent referrals to [facility].    ALFRED TORRES -   Ambulatory Case Management    11/11/2022, 12:39 EST

## 2022-11-14 ENCOUNTER — PATIENT OUTREACH (OUTPATIENT)
Dept: CASE MANAGEMENT | Facility: OTHER | Age: 82
End: 2022-11-14

## 2022-11-14 NOTE — OUTREACH NOTE
Patient Outreach      PARKER received an incoming call from miriam Galvin. Glenis states that she spoke with [facility] and they requested a referral. PARKER sent referral as requested.    ALFRED TORRES -   Ambulatory Case Management    11/14/2022, 16:09 EST

## 2022-11-15 ENCOUNTER — PATIENT OUTREACH (OUTPATIENT)
Dept: CASE MANAGEMENT | Facility: OTHER | Age: 82
End: 2022-11-15

## 2022-11-15 NOTE — OUTREACH NOTE
Patient Outreach    SW received an incoming call from [facility] requesting more info. PARKER sent requested info as requested.     ALFRED TORRES -   Ambulatory Case Management    11/15/2022, 10:38 EST

## 2022-11-16 ENCOUNTER — PATIENT OUTREACH (OUTPATIENT)
Dept: CASE MANAGEMENT | Facility: OTHER | Age: 82
End: 2022-11-16

## 2022-11-16 NOTE — OUTREACH NOTE
Patient Outreach    SW received an incoming call from Dani at [facility] [phone]. He is still reviewing referral and will need to talk to the director of nursing.    PARKER received another incoming call from Dani. Pt has been approved clinically. Dani requested social security number and medicare ID number to verify coverage. PARKER provided information as requested.    ALFRED TORRES -   Ambulatory Case Management    11/16/2022, 09:16 EST

## 2022-11-23 ENCOUNTER — PATIENT OUTREACH (OUTPATIENT)
Dept: CASE MANAGEMENT | Facility: OTHER | Age: 82
End: 2022-11-23

## 2022-11-23 NOTE — OUTREACH NOTE
Patient Outreach    PARKER contacted Dani with [facility]. Pt has been accepted. Family is coming for a meeting on Monday and pt will hopefully be admitted on Monday or Tuesday. PARKER attempted to call pt daughter and left a message.    ALFRED TORRES -   Ambulatory Case Management    11/23/2022, 15:15 EST

## 2022-11-28 ENCOUNTER — PATIENT OUTREACH (OUTPATIENT)
Dept: CASE MANAGEMENT | Facility: OTHER | Age: 82
End: 2022-11-28

## 2022-11-28 NOTE — OUTREACH NOTE
Care Coordination    PARKER  Received an incoming call from Dani at [facility]. He reports that the plan is to admit pt this week but requested updated information. PARKER advised that there was no new information.    ALFRED TORRES -   Ambulatory Case Management    11/28/2022, 13:54 EST

## 2022-12-07 ENCOUNTER — PATIENT OUTREACH (OUTPATIENT)
Dept: CASE MANAGEMENT | Facility: OTHER | Age: 82
End: 2022-12-07

## 2022-12-07 NOTE — OUTREACH NOTE
Patient Outreach    SW contacted pt granddaughter to follow up on placement. Pt granddaughter confirmed that pt was placed at [facility] last week. SW will close referral at this time.    ALFRED TORRES -   Ambulatory Case Management    12/7/2022, 09:44 EST

## 2023-12-12 ENCOUNTER — APPOINTMENT (OUTPATIENT)
Dept: GENERAL RADIOLOGY | Facility: HOSPITAL | Age: 83
End: 2023-12-12
Payer: MEDICARE

## 2023-12-12 ENCOUNTER — HOSPITAL ENCOUNTER (EMERGENCY)
Facility: HOSPITAL | Age: 83
Discharge: NURSING, REHAB OR LONG-TERM CARE | End: 2023-12-12
Attending: EMERGENCY MEDICINE | Admitting: EMERGENCY MEDICINE
Payer: MEDICARE

## 2023-12-12 VITALS
HEART RATE: 74 BPM | DIASTOLIC BLOOD PRESSURE: 76 MMHG | TEMPERATURE: 98 F | OXYGEN SATURATION: 96 % | SYSTOLIC BLOOD PRESSURE: 148 MMHG | RESPIRATION RATE: 16 BRPM | HEIGHT: 64 IN | WEIGHT: 130 LBS | BODY MASS INDEX: 22.2 KG/M2

## 2023-12-12 DIAGNOSIS — S92.301A MULTIPLE CLOSED FRACTURES OF METATARSAL BONE OF RIGHT FOOT, INITIAL ENCOUNTER: Primary | ICD-10-CM

## 2023-12-12 PROCEDURE — 99283 EMERGENCY DEPT VISIT LOW MDM: CPT

## 2023-12-12 PROCEDURE — 73630 X-RAY EXAM OF FOOT: CPT

## 2023-12-12 RX ORDER — ACETAMINOPHEN 500 MG
1000 TABLET ORAL ONCE
Status: COMPLETED | OUTPATIENT
Start: 2023-12-12 | End: 2023-12-12

## 2023-12-12 RX ADMIN — ACETAMINOPHEN 1000 MG: 500 TABLET ORAL at 13:55

## 2023-12-12 NOTE — CASE MANAGEMENT/SOCIAL WORK
Continued Stay Note  [facility]     Patient Name: Jia Bright  MRN: [id number]  Today's Date: 12/12/2023    Admit Date: 12/12/2023    Plan:  EMS   Discharge Plan       Row Name 12/12/23 1346       Plan    Plan  EMS    Plan Comments MSW contacted by RN regarding transportation assistance. MSW spoke to Brooke with  EMS who reported  EMS can transport @ 2:45PM. MSW completed the PCS form electronically and updated RN.    Final Discharge Disposition Code 01 - home or self-care                   Discharge Codes    No documentation.                       ARCENIO Cano

## 2023-12-12 NOTE — ED PROVIDER NOTES
EMERGENCY DEPARTMENT ENCOUNTER    Pt Name: Jia Bright  MRN: [id number]  Pt :   1940  Room Number:    Date of encounter:  2023  PCP: Provider, No Known  ED Provider: Farzad Stephens MD    Historian: Paramedics, patient, facility      HPI:  Chief Complaint: Right foot pain status post fall        Context: Jia Bright is a 83 y.o. female who presents to the ED c/o right foot pain.  The patient states that she stood up this morning and lost her balance.  She fell to the floor but does not believe she struck her head.  She complains of pain in her right foot.  She does not recall having x-rays performed but there is a report that radiology from her facility, [facility], showed a second metatarsal fracture.  The patient reports moderate pain in her foot only.  She denies any other injuries, specifically neck, back, thorax, pelvis.    PAST MEDICAL HISTORY  Past Medical History:   Diagnosis Date    Chronic bilateral low back pain without sciatica 2017    Dementia     MRI brain/carotic/EEG done    Gastroesophageal reflux disease without esophagitis 2019    Hyperlipidemia     Tobacco use disorder 2011         PAST SURGICAL HISTORY  Past Surgical History:   Procedure Laterality Date    CHOLECYSTECTOMY  1974         FAMILY HISTORY  Family History   Problem Relation Age of Onset    Alzheimer's disease Mother     Colon cancer Mother     Skin cancer Brother     Other Brother         amputation leg secondary to blood clot-details unknown    Hypertension Daughter     Diabetes Maternal Uncle     Colon cancer Maternal Grandfather          SOCIAL HISTORY  Social History     Socioeconomic History    Marital status:    Tobacco Use    Smoking status: Former     Packs/day: 1.50     Years: 56.00     Additional pack years: 0.00     Total pack years: 84.00     Types: Cigarettes     Quit date:      Years since quittin.9    Smokeless tobacco: Never    Tobacco comments:     4  cig a day    Substance and Sexual Activity    Alcohol use: No    Drug use: Defer    Sexual activity: Defer         ALLERGIES  Patient has no known allergies.        REVIEW OF SYSTEMS  Review of Systems       All systems reviewed and negative except for those discussed in HPI.       PHYSICAL EXAM    I have reviewed the triage vital signs and nursing notes.    ED Triage Vitals [12/12/23 1126]   Temp Heart Rate Resp BP SpO2   98 °F (36.7 °C) 74 18 146/87 96 %      Temp src Heart Rate Source Patient Position BP Location FiO2 (%)   Oral Monitor Sitting -- --       Physical Exam  GENERAL:   Appears in no acute distress.  Very pleasant.  I initially evaluate her in our hallway as we have no beds for immediate rooming.  HENT: Nares patent.  No signs of craniofacial trauma  EYES: No scleral icterus.  CV: Regular rhythm, regular rate.  2+ dorsalis pedis pulse.  No murmurs gallops rubs.  RESPIRATORY: Normal effort.  No audible wheezes, rales or rhonchi.  Clear to auscultation  ABDOMEN: Soft, nontender to deep palpation  MUSCULOSKELETAL: No deformities.  Moderate tenderness of the dorsal aspect of the right foot with overlying contusion but no skin breaks.  NEURO: Alert, moves all extremities, follows commands.  Fine touch and motor intact distal to the injury.  SKIN: Warm, dry, no rash visualized.  As above      LAB RESULTS  No results found for this or any previous visit (from the past 24 hour(s)).    If labs were ordered, I independently reviewed the results and considered them in treating the patient.        RADIOLOGY  XR Foot 3+ View Right    Result Date: 12/12/2023  XR FOOT 3+ VW RIGHT Date of Exam: 12/12/2023 11:55 AM EST Indication: Twist with report of metatarsal fracture but no x-rays Comparison: None available. Findings: There are fractures at the bases of the second through fifth metacarpal heads with mild lateral displacement of distal fracture fragments in relation to proximal. The third and fourth metacarpal  fracture sites are comminuted. There is no joint involvement. Joint compartments are maintained. There is osteoporosis. There is a slight bunion deformity.     Impression: Mildly displaced fractures of the second through fifth distal metatarsals. Electronically Signed: Cami Jones MD  12/12/2023 12:13 PM EST  Workstation ID: [id number]     I ordered and independently reviewed the above noted radiographic studies.      I viewed images of right foot radiographs x 3 which showed multiple distal metatarsal fractures, 4 in total, numbers 2 through 5, per my independent interpretation.    See radiologist's dictation for official interpretation.        PROCEDURES    Procedures    No orders to display       MEDICATIONS GIVEN IN ER    Medications   acetaminophen (TYLENOL) tablet 1,000 mg (has no administration in time range)         MEDICAL DECISION MAKING, PROGRESS, and CONSULTS    All labs, if obtained, have been independently reviewed by me.  All radiology studies, if obtained, have been reviewed by me and the radiologist dictating the report.  All EKG's, if obtained, have been independently viewed and interpreted by me/my attending physician.      Discussion below represents my analysis of pertinent findings related to patient's condition, differential diagnosis, treatment plan and final disposition.                         Differential diagnosis:    Fracture versus soft tissue injury.  Reasons for the fall could include more serious etiology than simply losing balance or stumbling to include neurologic, infectious, etc.      Additional sources:    - Discussed/ obtained information from independent historians: Paramedics gave report at bedside.    - External (non-ED) record review: I reviewed prior studies to include chest x-ray dated 5/2/2017 which showed hiatal hernia and hyperinflated lungs but no active disease.  No recent foot radiographs are available to me.     out reach note dated 12/7/2022 shows  the patient was placed at [facility] 1 week prior.    - Chronic or social conditions impacting care: Dementia, nursing home bound.    - Shared decision making: Patient is in full agreement with allowing us to evaluate and treat her.      Orders placed during this visit:  Orders Placed This Encounter   Procedures    [facility] 08.  CAM Boot; No; Yes; Multiple closed right foot metatarsal fractures; Yes    XR Foot 3+ View Right         Additional orders considered but not ordered:  MRI foot which can be ordered on an outpatient basis if felt to be necessary.    ED Course:    Consultants:      ED Course as of 12/12/23 1331   Tue Dec 12, 2023   1330 I have ordered Tylenol.  Patient declines narcotic pain medication.  I have ordered an Ortho boot. [MS]      ED Course User Index  [MS] Farzad Stephens MD              Shared Decision Making:  After my consideration of clinical presentation and any laboratory/radiology studies obtained, I discussed the findings with the patient/patient representative who is in agreement with the treatment plan and the final disposition.   Risks and benefits of discharge and/or observation/admission were discussed.       AS OF 13:31 EST VITALS:    BP - 146/87  HR - 74  TEMP - 98 °F (36.7 °C) (Oral)  O2 SATS - 96%                  DIAGNOSIS  Final diagnoses:   Multiple closed fractures of metatarsal bone of right foot, initial encounter         DISPOSITION  DISCHARGE    Patient discharged in stable condition.    Reviewed implications of results, diagnosis, meds, responsibility to follow up, warning signs and symptoms of possible worsening, potential complications and reasons to return to ER.    Patient/Family voiced understanding of above instructions.    Discussed plan for discharge, as there is no emergent indication for admission.  Pt/family is agreeable and understands need for follow up and possible repeat testing.  Pt/family is aware that discharge does not mean that  nothing is wrong but that it indicates no emergency is currently present that requires admission and they must continue care with follow-up as given below or with a physician of their choice.     FOLLOW-UP  Samuel Marrufo MD  [address] [address]  [phone]      NEXT AVAILABLE APPOINTMENT - RECHECK OF CONDITION    [facility] EMERGENCY DEPARTMENT  [address] [facility] [address] [phone]    IF YOU HAVE ANY CONCERN OF WORSENING CONDITION         Medication List      No changes were made to your prescriptions during this visit.             Please note that portions of this document were completed with voice recognition software.        Farzad Stephens MD  12/12/23 1254

## 2023-12-12 NOTE — DISCHARGE INSTRUCTIONS
Follow-up with on-call orthopedics next available.    Do not bear weight on this foot until cleared by orthopedics.

## 2023-12-22 ENCOUNTER — OFFICE VISIT (OUTPATIENT)
Dept: ORTHOPEDIC SURGERY | Facility: CLINIC | Age: 83
End: 2023-12-22
Payer: MEDICARE

## 2023-12-22 VITALS
BODY MASS INDEX: 23.56 KG/M2 | HEIGHT: 64 IN | DIASTOLIC BLOOD PRESSURE: 74 MMHG | WEIGHT: 138 LBS | SYSTOLIC BLOOD PRESSURE: 100 MMHG

## 2023-12-22 DIAGNOSIS — S92.331A CLOSED DISPLACED FRACTURE OF THIRD METATARSAL BONE OF RIGHT FOOT, INITIAL ENCOUNTER: ICD-10-CM

## 2023-12-22 DIAGNOSIS — S92.321A CLOSED DISPLACED FRACTURE OF SECOND METATARSAL BONE OF RIGHT FOOT, INITIAL ENCOUNTER: Primary | ICD-10-CM

## 2023-12-22 DIAGNOSIS — S92.351A CLOSED DISPLACED FRACTURE OF FIFTH METATARSAL BONE OF RIGHT FOOT, INITIAL ENCOUNTER: ICD-10-CM

## 2023-12-22 DIAGNOSIS — S92.341A CLOSED DISPLACED FRACTURE OF FOURTH METATARSAL BONE OF RIGHT FOOT, INITIAL ENCOUNTER: ICD-10-CM

## 2023-12-22 RX ORDER — TRAMADOL HYDROCHLORIDE 50 MG/1
50 TABLET ORAL EVERY 8 HOURS PRN
COMMUNITY
Start: 2023-12-13

## 2023-12-22 RX ORDER — VENLAFAXINE HYDROCHLORIDE 75 MG/1
75 CAPSULE, EXTENDED RELEASE ORAL DAILY
COMMUNITY
Start: 2023-12-05

## 2023-12-22 NOTE — PROGRESS NOTES
Orthopaedic Clinic Note: New Patient    Chief Complaint   Patient presents with    Right Foot - Pain        HPI    Jia Bright is a 83 y.o. female who presents with right foot fracture.  Onset: mechanical fall. The issue has been ongoing for 1 week(s). Pain is a 9/10 on the pain scale. Pain is described as aching. Associated symptoms include pain. The pain is worse with standing; resting improve the pain. Previous treatments have included: bracing.    I have reviewed the following portions of the patient's history:History of Present Illness    Past Medical History:   Diagnosis Date    Chronic bilateral low back pain without sciatica 2017    Dementia     MRI brain/carotic/EEG done    Gastroesophageal reflux disease without esophagitis 2019    Hyperlipidemia     Tobacco use disorder 2011      Past Surgical History:   Procedure Laterality Date    CHOLECYSTECTOMY  1974      Family History   Problem Relation Age of Onset    Alzheimer's disease Mother     Colon cancer Mother     Skin cancer Brother     Other Brother         amputation leg secondary to blood clot-details unknown    Hypertension Daughter     Diabetes Maternal Uncle     Colon cancer Maternal Grandfather      Social History     Socioeconomic History    Marital status:    Tobacco Use    Smoking status: Former     Packs/day: 1.50     Years: 56.00     Additional pack years: 0.00     Total pack years: 84.00     Types: Cigarettes     Quit date:      Years since quittin.9    Smokeless tobacco: Never    Tobacco comments:     4 cig a day    Vaping Use    Vaping Use: Never used   Substance and Sexual Activity    Alcohol use: No    Drug use: Defer    Sexual activity: Defer      Current Outpatient Medications on File Prior to Visit   Medication Sig Dispense Refill    Calcium Carb-Cholecalciferol (OS-OSCAR CALCIUM + D3) 500-200 MG-UNIT tablet Take 1 tablet by mouth Daily. 60 tablet 11    Cholecalciferol (Vitamin D3) 125 MCG (5000 UT)  "tablet dispersible Place 1 tablet on the tongue Daily. 90 tablet 3    Cyanocobalamin (B-12) 1000 MCG tablet Take 1 tablet by mouth Daily. 90 tablet 3    donepezil (ARICEPT) 5 MG tablet Take 1 tablet by mouth Every Evening. 90 tablet 1    memantine (NAMENDA XR) 14 MG capsule sustained-release 24 hr extended release capsule Take 1 capsule by mouth Daily. 90 capsule 1    MULTIPLE VITAMIN PO Take 1 tablet by mouth Daily.      nystatin (MYCOSTATIN) 021032 UNIT/GM cream apply by topical route 2 times every day to the affected area(s)      olopatadine (Patanol) 0.1 % ophthalmic solution Administer 1 drop to both eyes 2 (Two) Times a Day As Needed for Allergies. 1 each 12    oxybutynin XL (DITROPAN-XL) 10 MG 24 hr tablet Take 1 tablet by mouth Daily. 30 tablet 5    pravastatin (PRAVACHOL) 20 MG tablet Take 1 tablet by mouth Every Evening. 90 tablet 3    traMADol (ULTRAM) 50 MG tablet Take 1 tablet by mouth Every 8 (Eight) Hours As Needed.      venlafaxine XR (EFFEXOR-XR) 75 MG 24 hr capsule Take 1 capsule by mouth Daily.       No current facility-administered medications on file prior to visit.      No Known Allergies     Review of Systems   Constitutional: Negative.    HENT: Negative.     Eyes: Negative.    Respiratory: Negative.     Cardiovascular: Negative.    Gastrointestinal: Negative.    Endocrine: Negative.    Genitourinary: Negative.    Musculoskeletal:  Positive for arthralgias.   Skin: Negative.    Allergic/Immunologic: Negative.    Neurological: Negative.    Hematological: Negative.    Psychiatric/Behavioral: Negative.          The patient's Review of Systems was personally reviewed and confirmed as accurate.    The following portions of the patient's history were reviewed and updated as appropriate: allergies, current medications, past family history, past medical history, past social history, past surgical history, and problem list.    Physical Exam  Blood pressure 100/74, height 162.6 cm (64.02\"), weight 62.6 " kg (138 lb).    Body mass index is 23.68 kg/m².    GENERAL APPEARANCE: awake, alert & oriented x 3, in no acute distress and well developed, well nourished  PSYCH: normal affect  LUNGS:  breathing nonlabored  EYES: sclera anicteric  CARDIOVASCULAR: palpable dorsalis pedis, palpable posterior tibial bilaterally. Capillary refill less than 2 seconds  EXTREMITIES: no clubbing, cyanosis  GAIT: Not assessed            Right Lower Extremity Exam:   Full, painless range of motion of ankle, toes.  Toe range of motion is limited secondary to pain.  She is focally tender to palpation about the forefoot at the metatarsal heads of 2 through 5.  There is focal swelling in the forefoot.  No open wounds or skin lesions identified. Intact EHL, FHL, tibialis anterior, and gastrocsoleus. Sensation intact to light touch to deep peroneal, superficial peroneal, sural, saphenous, tibial nerves. Palpable DP and PT pulses.     ______________________________________________________________________  ______________________________________________________________________    RADIOGRAPHIC FINDINGS:   X-rays of the right foot from 12/12/2023 were personally reviewed.  Radiographs demonstrate mildly displaced fractures of second through fifth metatarsal necks.    Assessment/Plan:   Diagnosis Plan   1. Closed displaced fracture of second metatarsal bone of right foot, initial encounter        2. Closed displaced fracture of third metatarsal bone of right foot, initial encounter        3. Closed displaced fracture of fourth metatarsal bone of right foot, initial encounter        4. Closed displaced fracture of fifth metatarsal bone of right foot, initial encounter          Patient has metatarsal fractures that can be treated nonoperatively.  I discussed that if significant displacement occurred, surgical stabilization would be recommended but given the mild displacement and her age, this can be treated nonoperatively.  We will continue the boot for  at least 6 to 8 weeks.  She may be weightbearing as tolerated in the boot.  I will see her back in 4 weeks for repeat assessment with x-ray 3 views right foot on return.    Samuel Marrufo MD  12/22/23  08:19 EST

## 2024-01-19 ENCOUNTER — OFFICE VISIT (OUTPATIENT)
Dept: ORTHOPEDIC SURGERY | Facility: CLINIC | Age: 84
End: 2024-01-19
Payer: MEDICARE

## 2024-01-19 VITALS
BODY MASS INDEX: 23.05 KG/M2 | HEIGHT: 64 IN | DIASTOLIC BLOOD PRESSURE: 72 MMHG | SYSTOLIC BLOOD PRESSURE: 110 MMHG | WEIGHT: 135 LBS

## 2024-01-19 DIAGNOSIS — S92.321D CLOSED DISPLACED FRACTURE OF SECOND METATARSAL BONE OF RIGHT FOOT WITH ROUTINE HEALING, SUBSEQUENT ENCOUNTER: Primary | ICD-10-CM

## 2024-01-19 DIAGNOSIS — Z09 FRACTURE FOLLOW-UP: ICD-10-CM

## 2024-01-19 DIAGNOSIS — S92.341D CLOSED DISPLACED FRACTURE OF FOURTH METATARSAL BONE OF RIGHT FOOT WITH ROUTINE HEALING, SUBSEQUENT ENCOUNTER: ICD-10-CM

## 2024-01-19 DIAGNOSIS — S92.331D CLOSED DISPLACED FRACTURE OF THIRD METATARSAL BONE OF RIGHT FOOT WITH ROUTINE HEALING, SUBSEQUENT ENCOUNTER: ICD-10-CM

## 2024-01-19 DIAGNOSIS — S92.351D CLOSED DISPLACED FRACTURE OF FIFTH METATARSAL BONE OF RIGHT FOOT WITH ROUTINE HEALING, SUBSEQUENT ENCOUNTER: ICD-10-CM

## 2024-01-19 NOTE — PROGRESS NOTES
Orthopaedic Clinic Note: Established Patient    Chief Complaint   Patient presents with    Follow-up     4 wk f/u - Closed displaced fracture of second metatarsal bone of right foot DOI 23        HPI    It has been 4  week(s) since Ms. Bright's last visit. She returns to clinic today for Follow-up second through fifth metatarsal fractures.  She has been placed in a boot with weightbearing as tolerated in the boot since her last appointment.  She states her pain is improving.  Currently rates it a 2/10 on the pain scale.  Denies fevers chills or constitutional symptoms.  Overall she is doing about the same.    Past Medical History:   Diagnosis Date    Chronic bilateral low back pain without sciatica 2017    Dementia     MRI brain/carotic/EEG done    Gastroesophageal reflux disease without esophagitis 2019    Hyperlipidemia     Tobacco use disorder 2011      Past Surgical History:   Procedure Laterality Date    CHOLECYSTECTOMY  1974      Family History   Problem Relation Age of Onset    Alzheimer's disease Mother     Colon cancer Mother     Skin cancer Brother     Other Brother         amputation leg secondary to blood clot-details unknown    Hypertension Daughter     Diabetes Maternal Uncle     Colon cancer Maternal Grandfather      Social History     Socioeconomic History    Marital status:    Tobacco Use    Smoking status: Former     Packs/day: 1.50     Years: 56.00     Additional pack years: 0.00     Total pack years: 84.00     Types: Cigarettes     Quit date:      Years since quittin.0    Smokeless tobacco: Never    Tobacco comments:     4 cig a day    Vaping Use    Vaping Use: Never used   Substance and Sexual Activity    Alcohol use: No    Drug use: Defer    Sexual activity: Defer      Current Outpatient Medications on File Prior to Visit   Medication Sig Dispense Refill    Calcium Carb-Cholecalciferol (OS-OSCAR CALCIUM + D3) 500-200 MG-UNIT tablet Take 1 tablet by  "mouth Daily. 60 tablet 11    Cholecalciferol (Vitamin D3) 125 MCG (5000 UT) tablet dispersible Place 1 tablet on the tongue Daily. 90 tablet 3    Cyanocobalamin (B-12) 1000 MCG tablet Take 1 tablet by mouth Daily. 90 tablet 3    donepezil (ARICEPT) 5 MG tablet Take 1 tablet by mouth Every Evening. 90 tablet 1    memantine (NAMENDA XR) 14 MG capsule sustained-release 24 hr extended release capsule Take 1 capsule by mouth Daily. 90 capsule 1    MULTIPLE VITAMIN PO Take 1 tablet by mouth Daily.      nystatin (MYCOSTATIN) 558476 UNIT/GM cream apply by topical route 2 times every day to the affected area(s)      olopatadine (Patanol) 0.1 % ophthalmic solution Administer 1 drop to both eyes 2 (Two) Times a Day As Needed for Allergies. 1 each 12    oxybutynin XL (DITROPAN-XL) 10 MG 24 hr tablet Take 1 tablet by mouth Daily. 30 tablet 5    pravastatin (PRAVACHOL) 20 MG tablet Take 1 tablet by mouth Every Evening. 90 tablet 3    traMADol (ULTRAM) 50 MG tablet Take 1 tablet by mouth Every 8 (Eight) Hours As Needed.      venlafaxine XR (EFFEXOR-XR) 75 MG 24 hr capsule Take 1 capsule by mouth Daily.       No current facility-administered medications on file prior to visit.      No Known Allergies     Review of Systems   Constitutional: Negative.    HENT: Negative.     Eyes: Negative.    Respiratory: Negative.     Cardiovascular: Negative.    Gastrointestinal: Negative.    Endocrine: Negative.    Genitourinary: Negative.    Musculoskeletal:  Positive for arthralgias.   Skin: Negative.    Allergic/Immunologic: Negative.    Neurological: Negative.    Hematological: Negative.    Psychiatric/Behavioral: Negative.          The patient's Review of Systems was personally reviewed and confirmed as accurate.    Physical Exam  Blood pressure 110/72, height 162.6 cm (64.02\"), weight 61.2 kg (135 lb).    Body mass index is 23.16 kg/m².    GENERAL APPEARANCE: awake, alert, oriented, in no acute distress and well developed, well " nourished  LUNGS:  breathing nonlabored  EXTREMITIES: no clubbing, cyanosis      Right Lower Extremity Exam:   Remains tenderness to palpation about the second through fifth metatarsal necks.  Swelling has resolved.  No open wounds or skin lesions identified. Intact EHL, FHL, tibialis anterior, and gastrocsoleus. Sensation intact to light touch to deep peroneal, superficial peroneal, sural, saphenous, tibial nerves. Palpable DP and PT pulses.      _______________________________________________________________  _______________________________________________________________    RADIOGRAPHIC FINDINGS:   Indication: Follow-up right second through fifth metatarsal fractures    Comparison: Todays xrays were compared to previous xrays from 12/12/2023    Right foot 3 views: Radiographs demonstrate callus formation at the fracture sites of the second through fifth metatarsals with no change in fracture alignment compared to prior imaging.      Assessment/Plan:   Diagnosis Plan   1. Closed displaced fracture of second metatarsal bone of right foot with routine healing, subsequent encounter        2. Fracture follow-up  XR Foot 3+ View Right      3. Closed displaced fracture of third metatarsal bone of right foot with routine healing, subsequent encounter        4. Closed displaced fracture of fourth metatarsal bone of right foot with routine healing, subsequent encounter        5. Closed displaced fracture of fifth metatarsal bone of right foot with routine healing, subsequent encounter          Fracture is continuing to heal with no further displacement.  We will continue nonoperative treatment at this time.  Continue weightbearing as tolerated in the boot for the next 4 weeks.  Will see her back in 4 weeks for repeat assessment x-ray 3 views right foot on return.    Samuel Marrufo MD  01/19/24  08:38 EST

## 2024-02-16 ENCOUNTER — OFFICE VISIT (OUTPATIENT)
Dept: ORTHOPEDIC SURGERY | Facility: CLINIC | Age: 84
End: 2024-02-16
Payer: MEDICARE

## 2024-02-16 VITALS
DIASTOLIC BLOOD PRESSURE: 66 MMHG | WEIGHT: 135 LBS | SYSTOLIC BLOOD PRESSURE: 118 MMHG | HEIGHT: 64 IN | BODY MASS INDEX: 23.05 KG/M2

## 2024-02-16 DIAGNOSIS — S92.321D CLOSED DISPLACED FRACTURE OF SECOND METATARSAL BONE OF RIGHT FOOT WITH ROUTINE HEALING, SUBSEQUENT ENCOUNTER: ICD-10-CM

## 2024-02-16 DIAGNOSIS — Z09 FRACTURE FOLLOW-UP: Primary | ICD-10-CM

## 2024-02-16 DIAGNOSIS — S92.351D CLOSED DISPLACED FRACTURE OF FIFTH METATARSAL BONE OF RIGHT FOOT WITH ROUTINE HEALING, SUBSEQUENT ENCOUNTER: ICD-10-CM

## 2024-02-16 DIAGNOSIS — S92.331D CLOSED DISPLACED FRACTURE OF THIRD METATARSAL BONE OF RIGHT FOOT WITH ROUTINE HEALING, SUBSEQUENT ENCOUNTER: ICD-10-CM

## 2024-02-16 DIAGNOSIS — S92.341D CLOSED DISPLACED FRACTURE OF FOURTH METATARSAL BONE OF RIGHT FOOT WITH ROUTINE HEALING, SUBSEQUENT ENCOUNTER: ICD-10-CM
